# Patient Record
Sex: FEMALE | Race: WHITE | Employment: FULL TIME | URBAN - METROPOLITAN AREA
[De-identification: names, ages, dates, MRNs, and addresses within clinical notes are randomized per-mention and may not be internally consistent; named-entity substitution may affect disease eponyms.]

---

## 2021-01-05 ENCOUNTER — HOSPITAL ENCOUNTER (EMERGENCY)
Age: 26
Discharge: HOME | End: 2021-01-05
Attending: EMERGENCY MEDICINE
Payer: COMMERCIAL

## 2021-01-05 VITALS
HEIGHT: 65 IN | WEIGHT: 205.2 LBS | HEART RATE: 75 BPM | SYSTOLIC BLOOD PRESSURE: 117 MMHG | BODY MASS INDEX: 34.19 KG/M2 | TEMPERATURE: 99.2 F | DIASTOLIC BLOOD PRESSURE: 73 MMHG | OXYGEN SATURATION: 99 % | RESPIRATION RATE: 18 BRPM

## 2021-01-05 DIAGNOSIS — Z20.822 SUSPECTED COVID-19 VIRUS INFECTION: Primary | ICD-10-CM

## 2021-01-05 PROCEDURE — 99282 EMERGENCY DEPT VISIT SF MDM: CPT

## 2021-01-05 PROCEDURE — U0003 INFECTIOUS AGENT DETECTION BY NUCLEIC ACID (DNA OR RNA); SEVERE ACUTE RESPIRATORY SYNDROME CORONAVIRUS 2 (SARS-COV-2) (CORONAVIRUS DISEASE [COVID-19]), AMPLIFIED PROBE TECHNIQUE, MAKING USE OF HIGH THROUGHPUT TECHNOLOGIES AS DESCRIBED BY CMS-2020-01-R: HCPCS

## 2021-01-05 RX ORDER — BENZONATATE 100 MG/1
100 CAPSULE ORAL 3 TIMES DAILY PRN
Qty: 30 CAPSULE | Refills: 0 | Status: SHIPPED | OUTPATIENT
Start: 2021-01-05 | End: 2021-01-12

## 2021-01-05 RX ORDER — ALBUTEROL SULFATE 90 UG/1
2 AEROSOL, METERED RESPIRATORY (INHALATION) EVERY 4 HOURS PRN
Qty: 1 INHALER | Refills: 0 | Status: SHIPPED | OUTPATIENT
Start: 2021-01-05 | End: 2021-03-19

## 2021-01-05 ASSESSMENT — PAIN SCALES - GENERAL: PAINLEVEL_OUTOF10: 3

## 2021-01-06 ENCOUNTER — CARE COORDINATION (OUTPATIENT)
Dept: CARE COORDINATION | Age: 26
End: 2021-01-06

## 2021-01-06 LAB
SARS-COV-2, RAPID: ABNORMAL
SARS-COV-2: ABNORMAL
SARS-COV-2: DETECTED
SOURCE: ABNORMAL

## 2021-01-06 NOTE — ACP (ADVANCE CARE PLANNING)
Advance Care Planning   Healthcare Decision Maker:viktor her boyfriend      Click here to complete Healthcare Decision Makers including selection of the Healthcare Decision Maker Relationship (ie \"Primary\")  Today we documented desired Decision Maker(s), who is (are) NOT Legal Next of Stephen 71. ACP documents are required for decision maker authority. explained to pt she will need documentation for her boyfriend to be her healthcare decision maker. She did not want to list anyone else at this time.

## 2021-01-06 NOTE — ED NOTES
Patient presented to the ED with complaints of headache, generalized body aches and loss of smell. Stated this started Sunday and has progressively got worse. She denies fever or SOB. No diarrhea, or vomiting. Independent. A&Ox4.       Aixa Chen RN  01/05/21 6760

## 2021-01-06 NOTE — ED PROVIDER NOTES
range of motion and neck supple. Cardiovascular:      Rate and Rhythm: Normal rate and regular rhythm. Pulmonary:      Effort: Pulmonary effort is normal.      Breath sounds: Normal breath sounds. Abdominal:      General: There is no distension. Palpations: Abdomen is soft. Tenderness: There is no abdominal tenderness. Musculoskeletal: Normal range of motion. Skin:     General: Skin is warm. Findings: No rash. Neurological:      Mental Status: She is alert and oriented to person, place, and time. Psychiatric:         Behavior: Behavior normal.                 DIAGNOSTIC RESULTS     EKG: All EKG's are interpreted by the Emergency Department Physician who either signs or Co-signs this chart in the absence of a cardiologist.        RADIOLOGY:   Non-plain film images such as CT, Ultrasound and MRI are read by the radiologist. Plain radiographic images arevisualized and preliminarily interpreted by the emergency physician with the below findings:        Interpretation per the Radiologist below, if available at thetime of this note:          ED BEDSIDE ULTRASOUND:   Performed by ED Physician - none    LABS:  Labs Reviewed   COVID-19       All other labs were within normal range or not returned as of this dictation. EMERGENCY DEPARTMENT COURSE and DIFFERENTIAL DIAGNOSIS/MDM:   Vitals:    Vitals:    01/05/21 2013   BP: 117/73   Pulse: 75   Resp: 18   Temp: 99.2 °F (37.3 °C)   TempSrc: Oral   SpO2: 99%   Weight: 205 lb 3.2 oz (93.1 kg)   Height: 5' 5\" (1.651 m)       The patient was evaluated during the global COVID-19 pandemic, and that diagnosis was suspected/considered upon their initial presentation. Their evaluation, treatment and testing was consistent with current guidelines for patients who present with complaints or symptoms that may be related to COVID-19. Patient's oxygen level is 99% on room air. No respiratory distress.   Patient will be discharged home after nonrapid Covid testing with symptomatic management and work note. CONSULTS:  None    PROCEDURES:  Procedures        FINAL IMPRESSION      1. Suspected COVID-19 virus infection          DISPOSITION/PLAN   DISPOSITION Decision To Discharge 01/05/2021 08:48:09 PM      PATIENTREFERRED TO:   No follow-up provider specified.     DISCHARGE MEDICATIONS:     New Prescriptions    ALBUTEROL SULFATE HFA (PROAIR HFA) 108 (90 BASE) MCG/ACT INHALER    Inhale 2 puffs into the lungs every 4 hours as needed for Wheezing    BENZONATATE (TESSALON PERLES) 100 MG CAPSULE    Take 1 capsule by mouth 3 times daily as needed for Cough           (Please note that portions of this note were completed with a voice recognition program.  Efforts were made to edit thedictations but occasionally words are mis-transcribed.)    DEB Ly PA-C  01/05/21 [address] [place] [address], MD  01/06/21 0039

## 2021-01-06 NOTE — CARE COORDINATION
Patient contacted regarding Jenae Liming. Discussed COVID-19 related testing which was pending at this time. Test results were pending. Patient informed of results, if available? Yes    Care Transition Nurse/ Ambulatory Care Manager contacted the patient by telephone to perform post discharge assessment. Call within 2 business days of discharge: Yes. Verified name and  with patient as identifiers. Provided introduction to self, and explanation of the CTN/ACM role, and reason for call due to risk factors for infection and/or exposure to COVID-19. Symptoms reviewed with patient who verbalized the following symptoms: pain or aching joints. Due to no new or worsening symptoms encounter was not routed to provider for escalation. Discussed follow-up appointments. If no appointment was previously scheduled, appointment scheduling offered: Yes  [facility] follow up appointment(s): No future appointments. Non-[facility] follow up appointment(s): will use [email or web address]    Non-face-to-face services provided:  Reviewed and followed up on pending diagnostic tests and treatments-covid      Advance Care Planning:   Does patient have an Advance Directive:  decision maker updated. Patient has following risk factors of: no known risk factors. CTN/ACM reviewed discharge instructions, medical action plan and red flags such as increased shortness of breath, increasing fever and signs of decompensation with patient who verbalized understanding. Discussed exposure protocols and quarantine with CDC Guidelines What to do if you are sick with coronavirus disease .  Patient was given an opportunity for questions and concerns. The patient agrees to contact the [facility] exposure line [phone], local [facility] department [place] [facility]: ([phone]) and PCP office for questions related to their healthcare. CTN/ACM provided contact information for future needs.     Reviewed and educated patient on any new and changed

## 2021-02-14 ENCOUNTER — APPOINTMENT (OUTPATIENT)
Dept: ULTRASOUND IMAGING | Age: 26
End: 2021-02-14
Payer: COMMERCIAL

## 2021-02-14 ENCOUNTER — HOSPITAL ENCOUNTER (EMERGENCY)
Age: 26
Discharge: HOME | End: 2021-02-15
Attending: EMERGENCY MEDICINE
Payer: COMMERCIAL

## 2021-02-14 VITALS
BODY MASS INDEX: 31.39 KG/M2 | SYSTOLIC BLOOD PRESSURE: 125 MMHG | OXYGEN SATURATION: 100 % | WEIGHT: 200 LBS | TEMPERATURE: 97.2 F | HEIGHT: 67 IN | RESPIRATION RATE: 16 BRPM | HEART RATE: 88 BPM | DIASTOLIC BLOOD PRESSURE: 84 MMHG

## 2021-02-14 DIAGNOSIS — N30.00 ACUTE CYSTITIS WITHOUT HEMATURIA: Primary | ICD-10-CM

## 2021-02-14 DIAGNOSIS — N76.0 BACTERIAL VAGINOSIS: ICD-10-CM

## 2021-02-14 DIAGNOSIS — B96.89 BACTERIAL VAGINOSIS: ICD-10-CM

## 2021-02-14 DIAGNOSIS — O02.1 MISSED ABORTION: ICD-10-CM

## 2021-02-14 LAB
-: ABNORMAL
AMORPHOUS: ABNORMAL
BACTERIA: ABNORMAL
BILIRUBIN URINE: NEGATIVE
CASTS UA: ABNORMAL /LPF (ref 0–8)
COLOR: YELLOW
CRYSTALS, UA: ABNORMAL /HPF
DIRECT EXAM: ABNORMAL
EPITHELIAL CELLS UA: ABNORMAL /HPF (ref 0–5)
GLUCOSE URINE: NEGATIVE
HCG QUANTITATIVE: ABNORMAL IU/L
KETONES, URINE: NEGATIVE
LEUKOCYTE ESTERASE, URINE: ABNORMAL
Lab: ABNORMAL
MUCUS: ABNORMAL
NITRITE, URINE: NEGATIVE
OTHER OBSERVATIONS UA: ABNORMAL
PH UA: 5.5 (ref 5–8)
PROTEIN UA: NEGATIVE
RBC UA: ABNORMAL /HPF (ref 0–4)
RENAL EPITHELIAL, UA: ABNORMAL /HPF
SPECIFIC GRAVITY UA: 1.01 (ref 1–1.03)
SPECIMEN DESCRIPTION: ABNORMAL
TRICHOMONAS: ABNORMAL
TURBIDITY: CLEAR
URINE HGB: NEGATIVE
UROBILINOGEN, URINE: NORMAL
WBC UA: ABNORMAL /HPF (ref 0–5)
YEAST: ABNORMAL

## 2021-02-14 PROCEDURE — 6370000000 HC RX 637 (ALT 250 FOR IP): Performed by: STUDENT IN AN ORGANIZED HEALTH CARE EDUCATION/TRAINING PROGRAM

## 2021-02-14 PROCEDURE — 76801 OB US < 14 WKS SINGLE FETUS: CPT

## 2021-02-14 PROCEDURE — 96372 THER/PROPH/DIAG INJ SC/IM: CPT

## 2021-02-14 PROCEDURE — 99285 EMERGENCY DEPT VISIT HI MDM: CPT

## 2021-02-14 PROCEDURE — 87591 N.GONORRHOEAE DNA AMP PROB: CPT

## 2021-02-14 PROCEDURE — 87510 GARDNER VAG DNA DIR PROBE: CPT

## 2021-02-14 PROCEDURE — 86850 RBC ANTIBODY SCREEN: CPT

## 2021-02-14 PROCEDURE — 84702 CHORIONIC GONADOTROPIN TEST: CPT

## 2021-02-14 PROCEDURE — 87480 CANDIDA DNA DIR PROBE: CPT

## 2021-02-14 PROCEDURE — 81001 URINALYSIS AUTO W/SCOPE: CPT

## 2021-02-14 PROCEDURE — 86901 BLOOD TYPING SEROLOGIC RH(D): CPT

## 2021-02-14 PROCEDURE — 87086 URINE CULTURE/COLONY COUNT: CPT

## 2021-02-14 PROCEDURE — 86900 BLOOD TYPING SEROLOGIC ABO: CPT

## 2021-02-14 PROCEDURE — 87660 TRICHOMONAS VAGIN DIR PROBE: CPT

## 2021-02-14 PROCEDURE — 93976 VASCULAR STUDY: CPT

## 2021-02-14 PROCEDURE — 87491 CHLMYD TRACH DNA AMP PROBE: CPT

## 2021-02-14 RX ORDER — CEPHALEXIN 500 MG/1
500 CAPSULE ORAL 4 TIMES DAILY
Qty: 28 CAPSULE | Refills: 0 | Status: SHIPPED | OUTPATIENT
Start: 2021-02-14 | End: 2021-02-21

## 2021-02-14 RX ORDER — METRONIDAZOLE 7.5 MG/G
GEL VAGINAL
Qty: 1 TUBE | Refills: 0 | Status: SHIPPED | OUTPATIENT
Start: 2021-02-14 | End: 2021-02-21

## 2021-02-14 RX ORDER — ACETAMINOPHEN 500 MG
1000 TABLET ORAL ONCE
Status: COMPLETED | OUTPATIENT
Start: 2021-02-14 | End: 2021-02-14

## 2021-02-14 RX ADMIN — ACETAMINOPHEN 1000 MG: 500 TABLET ORAL at 19:27

## 2021-02-14 ASSESSMENT — ENCOUNTER SYMPTOMS
VOMITING: 1
BACK PAIN: 0
ABDOMINAL PAIN: 1
COUGH: 0
NAUSEA: 1
SHORTNESS OF BREATH: 0
CONSTIPATION: 0
DIARRHEA: 0

## 2021-02-14 ASSESSMENT — PAIN SCALES - GENERAL
PAINLEVEL_OUTOF10: 1
PAINLEVEL_OUTOF10: 0

## 2021-02-14 NOTE — LETTER
[facility]  [address] [address]  Phone: [phone]               February 15, 2021    Patient: Paula Beal   YOB: 1995   Date of Visit: 2/14/2021       To Whom It May Concern:    Paula Beal was seen and treated in our emergency department on 2/14/2021.  She   May return to work 2/21/2021    Sincerely,       Tiffany Javed RN         Signature:__________________________________

## 2021-02-14 NOTE — ED TRIAGE NOTES
Pt sts she is +HCG but has not been to OB yet. Pt reported abd cramping without vaginal bleeding/discharge. Pt sts she is unsure of gestational age due to irregular cycles. Pt sts she is not 20 weeks.

## 2021-02-15 LAB
ABO/RH: NORMAL
ANTIBODY SCREEN: NEGATIVE
C TRACH DNA GENITAL QL NAA+PROBE: NEGATIVE
CULTURE: NORMAL
Lab: NORMAL
N. GONORRHOEAE DNA: NEGATIVE
SPECIMEN DESCRIPTION: NORMAL
SPECIMEN DESCRIPTION: NORMAL

## 2021-02-15 PROCEDURE — 99242 OFF/OP CONSLTJ NEW/EST SF 20: CPT | Performed by: OBSTETRICS & GYNECOLOGY

## 2021-02-15 PROCEDURE — 6360000002 HC RX W HCPCS: Performed by: STUDENT IN AN ORGANIZED HEALTH CARE EDUCATION/TRAINING PROGRAM

## 2021-02-15 RX ADMIN — HUMAN RHO(D) IMMUNE GLOBULIN 300 MCG: 300 INJECTION, SOLUTION INTRAMUSCULAR at 00:18

## 2021-02-15 NOTE — ED NOTES
Pt notified of spontaneous , water and tissues provided. Pt and geremias tearful. No current complaints at this time       Isabel Zuluaga, WOJCIECH  21 4318

## 2021-02-15 NOTE — ED PROVIDER NOTES
[facility]   Emergency Department  Emergency Medicine Attending Sign-out     Care of [place] was assumed from previous attending Dr. Kamran Blackburn and is being seen for Abdominal Cramping  . The patient's initial evaluation and plan have been discussed with the prior provider who initially evaluated the patient. Attestation  I was available and discussed any additional care issues that arose and coordinated the management plans with the resident(s) caring for the patient during my duty period. Any areas of disagreement with resident's documentation of care or procedures are noted on the chart. I was personally present for the key portions of any/all procedures, during my duty period. I have documented in the chart those procedures where I was not present during the key portions. BRIEF PATIENT SUMMARY/MDM COURSE PER INITIAL PROVIDER:   RECENT VITALS:     Temp: 97.2 °F (36.2 °C),  Pulse: 88, Resp: 16, BP: 125/84, SpO2: 100 %    This patient is a 23 y.o. Female with spontaneous . Patient has [address] with no fetal heart activity. Awaiting OB consult. DIAGNOSTICS/MEDICATIONS:     MEDICATIONS GIVEN:  ED Medication Orders (From admission, onward)    Start Ordered     Status Ordering Provider    21 [id number]  acetaminophen (TYLENOL) tablet 1,000 mg  ONCE      Last MAR action: Given - by Vielka STAPLETON on 21 at [address], [address] [facility] DNA PROBE - Abnormal; Notable for the following components:       Result Value    Direct Exam POSITIVE for Gardnerella vaginalis.  (*)     All other components within normal limits   HCG, QUANTITATIVE, PREGNANCY - Abnormal; Notable for the following components:    hCG XXPPT 27,358 (*)     All other components within normal limits   URINALYSIS WITH MICROSCOPIC - Abnormal; Notable for the following components:    Leukocyte Esterase, Urine SMALL (*)     Bacteria, UA FEW (*)     All other components within normal limits   CULTURE, URINE   C.TRACHOMATIS N.GONORRHOEAE DNA   ABO/RH       RADIOLOGY  Us Ob Less Than 14 Weeks Single Or First Gestation    Result Date: 2/14/2021  EXAMINATION: TRANSABDOMINAL FIRST TRIMESTER OBSTETRIC PELVIC ULTRASOUND WITH COLOR DOPPLER FLOW 2/14/2021 TECHNIQUE: TRANSABDOMINAL PELVIC ULTRASOUND WITH COLOR DOPPLER FLOW COMPARISON: None HISTORY: ORDERING SYSTEM PROVIDED HISTORY: R/o ectopic TECHNOLOGIST PROVIDED HISTORY: R/o ectopic FINDINGS: The uterus is mildly enlarged measuring 12.7 cm. .  There is an intrauterine gestational sac. There is evidence of an intrauterine pregnancy and an embryo seen within the gestational sac. The crown rump length measures 1.7 cm corresponding to 8 weeks, 1 day gestational age. No fetal heart rate is identified. There is a subchorionic hemorrhage measuring 3 cm Both ovaries are normal size and echogenicity. No adnexal masses are identified. There is nofree fluid in the cul-de-sac. Intrauterine pregnancy with a gestational age of 11 weeks, 1 day. No fetal heart rate is identified and the findings are worrisome for early pregnancy failure.        OUTSTANDING TASKS / ADDITIONAL COMMENTS   1. OB consult    Arian Mccauley MD  Emergency Medicine Attending  [facility]        Remy Mccall MD  02/14/21 4865

## 2021-02-15 NOTE — ED PROVIDER NOTES
[facility]  ED  Emergency Department Encounter  Emergency Medicine Resident     Pt Name: Janene Antunez  MRN: [id number]  [place] 1995  Date of evaluation: 2/14/21  PCP:  No primary care provider on file. CHIEF COMPLAINT       Chief Complaint   Patient presents with    Abdominal Cramping       HISTORY OFPRESENT ILLNESS  (Location/Symptom, Timing/Onset, Context/Setting, Quality, Duration, Modifying Factors,Severity.)      Janene Antunez is a 23 y.o. female who presents with complaints of lower abdominal cramping. Patient is G3, P2 at unknown gestational weeks. She states her last period was November but she has irregular periods and has not been seen or evaluated by OB during this pregnancy. She does admit to some nausea and nonbloody nonbilious emesis during this pregnancy but currently denies. She states the abdominal cramping has been intermittent over the last couple days. She denies any vaginal bleeding, dysuria, hematuria or passage of large clots. She denies any history of miscarriage previously. She states her other 2 pregnancies were normal, full-term vaginal deliveries with no complications. She does admit to some yellowish discharge but has no concerns for STDs. She denies fevers, chest pain, shortness of breath, lightheadedness, dizziness. PAST MEDICAL / SURGICAL / SOCIAL / FAMILY HISTORY      has no past medical history on file. has no past surgical history on file.      Social History     Socioeconomic History    Marital status: Single     Spouse name: Not on file    Number of children: Not on file    Years of education: Not on file    Highest education level: Not on file   Occupational History    Not on file   Social Needs    Financial resource strain: Not on file    Food insecurity     Worry: Not on file     Inability: Not on file    Transportation needs     Medical: Not on file     Non-medical: Not on file   Tobacco Use    Smoking status: Not on file   Substance and Sexual Activity    Alcohol use: Never     Frequency: Never    Drug use: Never    Sexual activity: Yes     Partners: Male   Lifestyle    Physical activity     Days per week: Not on file     Minutes per session: Not on file    Stress: Not on file   Relationships    Social connections     Talks on phone: Not on file     Gets together: Not on file     Attends Restorationist service: Not on file     Active member of club or organization: Not on file     Attends meetings of clubs or organizations: Not on file     Relationship status: Not on file    Intimate partner violence     Fear of current or ex partner: Not on file     Emotionally abused: Not on file     Physically abused: Not on file     Forced sexual activity: Not on file   Other Topics Concern    Not on file   Social History Narrative    Not on file       History reviewed. No pertinent family history. Allergies:  Patient has no known allergies. Home Medications:  Prior to Admission medications    Medication Sig Start Date End Date Taking? Authorizing Provider   cephALEXin (KEFLEX) 500 MG capsule Take 1 capsule by mouth 4 times daily for 7 days 2/14/21 2/21/21 Yes Eusebio Yoly, DO   metroNIDAZOLE (METROGEL VAGINAL) 0.75 % vaginal gel Place vaginally 2 times daily for 7 days. 2/14/21 2/21/21 Yes Eusebio Yoly, DO   albuterol sulfate HFA (PROAIR HFA) 108 (90 Base) MCG/ACT inhaler Inhale 2 puffs into the lungs every 4 hours as needed for Wheezing 1/5/21   Fabricio Green PA-C       REVIEW OFSYSTEMS    (2-9 systems for level 4, 10 or more for level 5)      Review of Systems   Constitutional: Negative for activity change, appetite change, chills, fatigue and fever. HENT: Negative for congestion. Eyes: Negative for visual disturbance. Respiratory: Negative for cough and shortness of breath. Cardiovascular: Negative for chest pain and leg swelling. Gastrointestinal: Positive for abdominal pain, nausea and vomiting. Negative for constipation and diarrhea. Genitourinary: Positive for vaginal discharge. Negative for difficulty urinating, dysuria, frequency, hematuria, urgency and vaginal bleeding. Musculoskeletal: Negative for back pain. Skin: Negative for rash and wound. Neurological: Negative for syncope, light-headedness and headaches. Hematological: Does not bruise/bleed easily. PHYSICAL EXAM   (up to 7 for level 4, 8 or more forlevel 5)      INITIAL VITALS:   ED Triage Vitals [02/14/21 1832]   BP Temp Temp Source Pulse Resp SpO2 Height Weight   125/84 97.2 °F (36.2 °C) Skin 88 16 100 % 5' 7\" (1.702 m) 200 lb (90.7 kg)       Physical Exam  Vitals signs and nursing note reviewed. Exam conducted with a chaperone present. Constitutional:       General: She is not in acute distress. Appearance: Normal appearance. She is well-developed. She is not diaphoretic. HENT:      Head: Normocephalic and atraumatic. Nose: Nose normal.   Cardiovascular:      Rate and Rhythm: Normal rate. Pulmonary:      Effort: Pulmonary effort is normal. No respiratory distress. Abdominal:      General: There is no distension. Palpations: Abdomen is soft. Tenderness: There is no abdominal tenderness. There is no guarding. Genitourinary:     Exam position: Supine. Labia:         Right: No rash or tenderness. Left: No rash or tenderness. Vagina: Vaginal discharge (White) present. No erythema, tenderness or bleeding. Cervix: Normal.      Uterus: Tender. Adnexa: Right adnexa normal and left adnexa normal.   Musculoskeletal: Normal range of motion. Skin:     General: Skin is warm and dry. Findings: No rash. Neurological:      Mental Status: She is alert. Mental status is at baseline. Psychiatric:         Behavior: Behavior normal.         Thought Content:  Thought content normal.         DIFFERENTIAL  DIAGNOSIS     PLAN (LABS / IMAGING / EKG):  Orders Placed This Encounter Procedures    Culture, Urine    C.trachomatis N.gonorrhoeae DNA    VAGINITIS DNA PROBE    US DUP ABD PEL RETRO SCROT LIMITED    US OB LESS THAN 14 WEEKS SINGLE OR FIRST GESTATION    HCG, QUANTITATIVE, PREGNANCY    Urinalysis with Microscopic    Inpatient consult to Obstetrics / Gynecology    ABO/RH    RHOGAM INJECTION ONLY       MEDICATIONS ORDERED:  Orders Placed This Encounter   Medications    acetaminophen (TYLENOL) tablet 1,000 mg    cephALEXin (KEFLEX) 500 MG capsule     Sig: Take 1 capsule by mouth 4 times daily for 7 days     Dispense:  28 capsule     Refill:  0    metroNIDAZOLE (METROGEL VAGINAL) 0.75 % vaginal gel     Sig: Place vaginally 2 times daily for 7 days. Dispense:  1 Tube     Refill:  0    DISCONTD: rho(D) immune globulin (HYPERRHO S/D) injection 300 mcg    rho(D) immune globulin (HYPERRHO S/D) injection 300 mcg         Initial MDM/Plan/ED COURSE:    23 y.o. female who presents with complaints of lower abdominal cramping early in pregnancy. On exam patient is well-appearing, nontoxic. Vital signs are within normal limits. On physical exam abdomen is obese, soft, nontender, nondistended. Difficult to palpate uterus. No acute respiratory distress, talking in full sentences. Bedside ultrasound shows likely early intrauterine pregnancy, however given patient's abdominal cramping, no OB care at this point, will plan for transvaginal ultrasound, beta quant, pelvic exam, urinalysis. If work-up unremarkable anticipate discharge home, with Tylenol and OB follow-up.  exam performed with RN at bedside shows some white vaginal discharge, os is closed, no vaginal bleeding or bleeding from the cervix. Some uterine tenderness but no right or left adnexal tenderness. No rashes or lesions. ED Course as of Feb 15 0027   Juanice Lunch Feb 14, 2021 2031 hCG Quant(!): 64,847 [LW]   2037 DIRECT EXAM.(!): POSITIVE for Gardnerella vaginalis.  [LW]   2810 Estimated gestational age of 11 weeks 1 day but no fetal heart rate identified, concerns for pregnancy failure and fetal demise. Patient and fiancé updated. Understanding plan to consult OB.    [LW]   36 Spoke with OB, they will evaluate the patient.    [LW]   7016 OB at bedside    [LW]   2301 OB recommending RhoGam if patient Rh negative. They state that then patient is able to be discharged with follow-up with Dr. Louana Krabbe in 1 to 2 weeks with bleeding return precautions. [LW]   6489 Patient a negative, will plan for RhoGam injection. [LW]      ED Course User Index  [LW] Gonzales Diane, DO      RhoGam given. We will plan to discharge patient home at this time. Patient given prescription for Keflex as well as Flagyl discharge take as prescribed complete the entire course even if she begins to feel better. She was instructed to follow-up with Dr. Louana Krabbe in the next week for reevaluation given missed . Patient given strict return precautions including any new or concerning symptoms such as vaginal bleeding, lightheadedness, dizziness, chest pain, shortness of breath, worsening abdominal pain or any other new or concerning symptoms. Patient agreed for discharge this time, all questions answered. Patient discharged home in stable condition. DIAGNOSTIC RESULTS / EMERGENCYDEPARTMENT COURSE / MDM     LABS:  Labs Reviewed   VAGINITIS DNA PROBE - Abnormal; Notable for the following components:       Result Value    Direct Exam POSITIVE for Gardnerella vaginalis.  (*)     All other components within normal limits   HCG, QUANTITATIVE, PREGNANCY - Abnormal; Notable for the following components:    hCG ROFRC 66,189 (*)     All other components within normal limits   URINALYSIS WITH MICROSCOPIC - Abnormal; Notable for the following components:    Leukocyte Esterase, Urine SMALL (*)     Bacteria, UA FEW (*)     All other components within normal limits   CULTURE, URINE   C.TRACHOMATIS N.GONORRHOEAE DNA   ABO/RH   RHOGAM INJECTION ONLY PROCEDURES:  None    CONSULTS:  IP CONSULT TO OB GYN    CRITICAL CARE:  Please see attending note    FINAL IMPRESSION      1. Acute cystitis without hematuria    2. Bacterial vaginosis    3. Missed           DISPOSITION / PLAN     DISPOSITION Decision To Discharge 02/15/2021 12:18:56 AM      PATIENT REFERRED TO:  [facility]  [address] [facility]  [phone]  Go to   If symptoms worsen    [address] [place] [address] [id number]  Call in 3 days      SeeJose J Sparks DO  [address] [facility]  [phone]    In 1 week  Missed AB       DISCHARGE MEDICATIONS:  New Prescriptions    CEPHALEXIN (KEFLEX) 500 MG CAPSULE    Take 1 capsule by mouth 4 times daily for 7 days    METRONIDAZOLE (METROGEL VAGINAL) 0.75 % VAGINAL GEL    Place vaginally 2 times daily for 7 days.        Dandre Macias DO  Emergency Medicine Resident    (Please note that portions of this note were completed with a voice recognition program.Efforts were made to edit the dictations but occasionally words are mis-transcribed.)        Dandre Macias DO  Resident  02/15/21 1911

## 2021-02-15 NOTE — ED NOTES
Pt resting in bed with even non labored breaths. Pt A&Ox4. Pt denies any complaints. Pt shows no signs of distress. Will continue to monitor.         Godfrey Plummer RN  02/14/21 2018

## 2021-02-15 NOTE — ED NOTES
The following labs labeled with pt sticker and tubed:     [] Lavender   [] on ice   [] Blue   [] Green/yellow  [] Green/black [] on ice  [x] Pink  [] Red  [] Yellow     [] COVID-19 swab    [] Rapid     [] Urine Sample  [] Pelvic Cultures    [] Blood Cultures            Gaston Polk RN  02/14/21 3775

## 2021-02-15 NOTE — ED NOTES
OB at bedside     Selwyn Mark, [id number]  02/14/21 4395

## 2021-02-15 NOTE — ED NOTES
The following labs labeled with pt sticker and tubed:     [x] Lavender   [] on ice   [x] Blue   [x] Green/yellow  [] Green/black [] on ice  [] Pink  [] Red  [x] Yellow     [] COVID-19 swab    [] Rapid     [x] Urine Sample  [] Pelvic Cultures    [] Blood Cultures            Isabel Zuluaga RN  02/14/21 1935

## 2021-02-15 NOTE — ED PROVIDER NOTES
[facility] ED     Emergency Department     Faculty Attestation    I performed a history and physical examination of the patient and discussed management with the resident. I reviewed the residents note and agree with the documented findings and plan of care. Any areas of disagreement are noted on the chart. I was personally present for the key portions of any procedures. I have documented in the chart those procedures where I was not present during the key portions. I have reviewed the emergency nurses triage note. I agree with the chief complaint, past medical history, past surgical history, allergies, medications, social and family history as documented unless otherwise noted below. For Physician Assistant/ Nurse Practitioner cases/documentation I have personally evaluated this patient and have completed at least one if not all key elements of the E/M (history, physical exam, and MDM). Additional findings are as noted. Patient presents with lower abdominal cramping. She says she has taken 2 home pregnancy test which have been positive. She says that the last period that she remembers having was around Thanksgiving. She denies fever, chest pain, shortness of breath, nausea, vomiting, abnormal vaginal bleeding or discharge. She denies dysuria. On exam, patient is resting comfortably in the bed. Lungs are clear to auscultation bilaterally heart sounds are normal.  Abdomen is soft and nontender. No rebound or guarding is present. The resident will perform a pelvic exam.  Will check pelvic labs, urinalysis, transvaginal ultrasound.       Rasheed Rodriguez MD  Attending Emergency  Physician              Xena Ferrera MD  02/14/21 Rudolph Álvarez

## 2021-02-15 NOTE — ED NOTES
The following labs labeled with pt sticker and tubed:     [] Lavender   [] on ice   [] Blue   [] Green/yellow  [] Green/black [] on ice  [] Pink  [] Red  [] Yellow     [] COVID-19 swab    [] Rapid     [] Urine Sample  [x] Pelvic Cultures    [] Blood Cultures            Bhaskar Schulz RN  02/14/21 1945

## 2021-02-15 NOTE — CONSULTS
[address] [facility]    Patient Name: General Briggs     Patient : 1995  Room/Bed:   Admission Date/Time: 2021  6:59 PM  Primary Care Physician: No primary care provider on file. Consulting Provider: Ariana Francis MD  Reason for Consult: Vaginal spotting and abdominal cramps    CC:   Chief Complaint   Patient presents with    Abdominal Cramping                HPI: General Briggs is a 23 y.o. female . presents to the ED with complains of abdominal cramping that has been going on for a couple of days. Patient said she has last period in November with episodes of irregular spotting and was experiencing lower abdominal cramps with episode of nausea and  non-bloody vomitus. Patient came to ED for further evaluation. Patient is denying any fever, chills, HA, CP, SOB, racing of heart, difficulty urination and constipation. In the ED patient was vitally stable , initial CBC was unremarkable. B-Hcg was [id number] and transvaginal US showed intrauterine pregnancy with gestational sac of 8 weeks with no heart rate identified. REVIEW OF SYSTEMS:   A minimum of an eleven point review of systems was completed.     Constitutional: negative fever, negative chills  HEENT: negative visual disturbances, negative headaches  Respiratory: negative dyspnea, negative cough  Cardiovascular: negative chest pain,  negative palpitations  Gastrointestinal: abdominal cramps, negative RUQ pain, N/V, negative diarrhea, negative constipation  Genitourinary: negative dysuria, negative vaginal discharge, negative vaginal bleeding   Dermatological: negative rash, negative wounds  Hematologic: negative bleeding/clotting disorder  Immunologic: negative recent illness, negative recent sick contact, negative allergic reactions  Lymphatic: negative lymph nodes  Musculoskeletal: negative back pain, negative myalgias, negative arthralgias  Neurological:  negative dizziness, negative weakness  Behavior/Psych: negative depression, negative anxiety  _______________________________________________________________________    GYNECOLOGICAL HISTORY:  Sexually Active: single partner  STD History: denies    Pap History: not available in EMR and patient is unsure. States she thinks she has had one pap in the past.    Permanent Sterilization: N/A   Reversible Birth Control: N/A  Hormone Replacement Exposure: N/A    OBSTETRICAL HISTORY:   OB History   No obstetric history on file. PAST MEDICAL HISTORY:   has no past medical history on file. PAST SURGICAL HISTORY:   has no past surgical history on file. ALLERGIES:  Allergies as of 02/14/2021    (No Known Allergies)       MEDICATIONS:  Current Facility-Administered Medications   Medication Dose Route Frequency Provider Last Rate Last Admin    rho(D) immune globulin (HYPERRHO S/D) injection 300 mcg  300 mcg Intramuscular Once Jolanta Gordilol, DO         Current Outpatient Medications   Medication Sig Dispense Refill    cephALEXin (KEFLEX) 500 MG capsule Take 1 capsule by mouth 4 times daily for 7 days 28 capsule 0    metroNIDAZOLE (METROGEL VAGINAL) 0.75 % vaginal gel Place vaginally 2 times daily for 7 days. 1 Tube 0    albuterol sulfate HFA (PROAIR HFA) 108 (90 Base) MCG/ACT inhaler Inhale 2 puffs into the lungs every 4 hours as needed for Wheezing 1 Inhaler 0       FAMILY HISTORY:  Family History of Breast, Ovarian, Colon or Uterine Cancer: No   family history is not on file. SOCIAL HISTORY:   reports that she does not drink alcohol or use drugs. ________________________________________________________________________                                    Chicaanalikemal StanleyAniyah:  Vitals:    02/14/21 1832   BP: 125/84   Pulse: 88   Resp: 16   Temp: 97.2 °F (36.2 °C)   SpO2: 100%                                                 INPUT/OUTPUT:  No intake/output data recorded. No intake/output data recorded. PHYSICAL EXAM:     General Appearance: Appears healthy. Alert; in no acute distress. Pleasant. Skin: Skin color, texture, turgor normal. No rashes or lesions. Lymphatic: No abnormally enlarged lymph nodes. Neck and EENT: normal atraumatic, no neck masses, normal thyroid, no jvd  Respiratory: Normal expansion. Clear to auscultation. No rales, rhonchi, or wheezing. Cardiovascular: regular rate and rhythm  Pelvic Exam: performed by ED Resident. Patient declining additional exam. Per Dr. Galina De Souza os is closed without active bleeding. Rectal Exam: exam declined by patient  Musculoskeletal: no gross abnormalities  Extremities: non-tender BLE and non-edematous  Psych:  oriented to time, place and person       LAB RESULTS:  Results for orders placed or performed during the hospital encounter of 02/14/21   VAGINITIS DNA PROBE    Specimen: Vaginal   Result Value Ref Range    Specimen Description . VAGINA     Special Requests NOT REPORTED     Direct Exam POSITIVE for Gardnerella vaginalis. (A)     Direct Exam NEGATIVE for Candida sp. Direct Exam NEGATIVE for Trichomonas vaginalis     Direct Exam       Method of testing is a DNA probe intended for detection and identification of Candida species, Gardnerella vaginalis, and Trichomonas vaginalis nucleic acid in vaginal fluid specimens from patients with symptoms of vaginitis/vaginosis.    HCG, QUANTITATIVE, PREGNANCY   Result Value Ref Range    hCG Quant 73,439 (H) <5 IU/L   Urinalysis with Microscopic   Result Value Ref Range    Color, UA YELLOW YELLOW    Turbidity UA CLEAR CLEAR    Glucose, Ur NEGATIVE NEGATIVE    Bilirubin Urine NEGATIVE NEGATIVE    Ketones, Urine NEGATIVE NEGATIVE    Specific Gravity, UA 1.009 1.005 - 1.030    Urine Hgb NEGATIVE NEGATIVE    pH, UA 5.5 5.0 - 8.0    Protein, UA NEGATIVE NEGATIVE    Urobilinogen, Urine Normal Normal    Nitrite, Urine NEGATIVE NEGATIVE    Leukocyte

## 2021-02-16 LAB
BLD PROD TYP BPU: NORMAL
DISPENSE STATUS BLOOD BANK: NORMAL
TRANSFUSION STATUS: NORMAL
UNIT DIVISION: 0
UNIT NUMBER: NORMAL

## 2021-02-17 ENCOUNTER — OFFICE VISIT (OUTPATIENT)
Dept: OBGYN CLINIC | Age: 26
End: 2021-02-17
Payer: COMMERCIAL

## 2021-02-17 VITALS
BODY MASS INDEX: 35.29 KG/M2 | HEART RATE: 101 BPM | SYSTOLIC BLOOD PRESSURE: 113 MMHG | WEIGHT: 211.8 LBS | HEIGHT: 65 IN | DIASTOLIC BLOOD PRESSURE: 75 MMHG

## 2021-02-17 DIAGNOSIS — Z76.89 ENCOUNTER TO ESTABLISH CARE: Primary | ICD-10-CM

## 2021-02-17 DIAGNOSIS — O02.1 MISSED ABORTION: ICD-10-CM

## 2021-02-17 PROCEDURE — 99203 OFFICE O/P NEW LOW 30 MIN: CPT | Performed by: OBSTETRICS & GYNECOLOGY

## 2021-02-17 RX ORDER — MISOPROSTOL 200 UG/1
800 TABLET ORAL ONCE
Qty: 4 TABLET | Refills: 0 | Status: SHIPPED | OUTPATIENT
Start: 2021-02-17 | End: 2021-03-19

## 2021-02-17 NOTE — PROGRESS NOTES
Waylon Grimm  2021                         Primary Care Physician: No primary care provider on file. CC:   Chief Complaint   Patient presents with    New Patient     follow-up miscarriage lmp around thanksgiving         HPI: Waylon Grimm is a 23 y.o. female  No LMP recorded (lmp unknown). The patient was seen and examined. She is here to establish care and for follow up of miscarriage. She was seen and evaluated in the [facility] ED on  due to cramping in pregnancy. Pelvic ultrasound at that itme confirmed an 8w1d missed AB. She received rhogam due to Rh negative status. She was counseled on management options and opted for expectant management at that time. Since then, she continues to cramping with slight spotting. She thinks she would like to proceed with medical management at this time. She reports that her periods are irregular and last 5-7 days. She has never gone more than 1-2 months without a period. Her bowel habits are regular. She denies any bloating. She denies dysuria. She denies urinary leaking. She denies vaginal discharge. She is sexually active with a male partner.     Depression Screen: Symptoms of decreased mood absent  Symptoms of anhedonia absent  **If either question is answered in a  positive fashion then complete the PHQ9 Scoring Evaluation and make the appropriate referral**    REVIEW OF SYSTEMS:   Constitutional: negative fever, negative chills  HEENT: negative visual disturbances, negative headaches  Respiratory: negative dyspnea, negative cough  Cardiovascular: negative chest pain,  negative palpitations  Gastrointestinal: negative abdominal pain, negative RUQ pain, negative N/V, negative diarrhea, negative constipation  Genitourinary: negative dysuria, negative vaginal discharge  Dermatological: negative rash  Hematologic: negative bruising  Immunologic/Lymphatic: negative recent illness, negative recent sick contact Musculoskeletal: negative back pain, negative myalgias, negative arthralgias  Neurological:  negative dizziness, negative weakness  Behavior/Psych: negative depression, negative anxiety      GYNECOLOGICAL HISTORY:  Age of Menarche: 17  Age of Menopause: n/a     Sexually Active: has sex with a male  STD History: denies    Pap History: reports one prior pap that was normal  Colposcopy History: denies    Permanent Sterilization: no  Reversible Birth Control: no  Hormone Replacement Exposure: no    OBSTETRICAL HISTORY:  OB History    Para Term  AB Living   2 0 0 0 0 2   SAB TAB Ectopic Molar Multiple Live Births   0 0 0 0 0 2      # Outcome Date GA Lbr Avelino/2nd Weight Sex Delivery Anes PTL Lv   2       Vag-Spont      1       Vag-Spont          PAST MEDICAL HISTORY:   has a past medical history of Asthma. PAST SURGICAL HISTORY:   has no past surgical history on file. ALLERGIES:  has No Known Allergies. MEDICATIONS:  Prior to Admission medications    Medication Sig Start Date End Date Taking? Authorizing Provider   cephALEXin (KEFLEX) 500 MG capsule Take 1 capsule by mouth 4 times daily for 7 days 23 Yes Adonis Art, DO   metroNIDAZOLE (METROGEL VAGINAL) 0.75 % vaginal gel Place vaginally 2 times daily for 7 days. 21 Yes Adonis Art, DO   albuterol sulfate HFA (PROAIR HFA) 108 (90 Base) MCG/ACT inhaler Inhale 2 puffs into the lungs every 4 hours as needed for Wheezing  Patient not taking: Reported on 2021   Manav Mayes PA-C       FAMILY HISTORY:  Family History of Breast, Ovarian, Colon or Uterine Cancer: No   family history includes Breast Cancer in her maternal great grandmother; Breast Cancer (age of onset: 79) in her paternal great grandmother. SOCIAL HISTORY:   reports that she has never smoked. She has never used smokeless tobacco. She reports that she does not drink alcohol or use drugs.     HEALTH MAINTENANCE: Immunization status: stated as up to date, no records available  Gardasil immunization: discussed    VITALS:  Vitals:    21 1415   BP: 113/75   Site: Left Upper Arm   Position: Sitting   Cuff Size: Large Adult   Pulse: 101   Weight: 211 lb 12.8 oz (96.1 kg)   Height: 5' 5\" (1.651 m)                                                                                                                                                                             PHYSICAL EXAM:   General Appearance: Appears healthy. Alert; in no acute distress. Pleasant. Skin: Skin color, texture, turgor normal. No rashes or lesions. HEENT: normocephalic and atraumatic  Respiratory: Normal expansion. Clear to auscultation. No rales, rhonchi, or wheezing. Cardiovascular: normal rate and normal S1 and S2  Abdomen:  soft, non-tender, non-distended, no right upper quadrant tenderness and no CVA tenderness  Musculoskeletal: no gross abnormalities  Extremities: non-tender BLE and non-edematous  Psych:  oriented to time, place and person, mood and affect are within normal limits       ASSESSMENT & PLAN:    Alicia Haynes is a 23 y.o. female  with 8w1d missed AB No LMP recorded (lmp unknown). - Records release signed for prior pap history     - She is unsure if she previously received the Gardasil immunization   - Reviewed expectant, medical and surgical management options for early pregnancy loss, including side effect profiles, dosing regimens and surgical risks/benefits/alternatives. - Risks, benefits and alternatives of surgery discussed, including but not limited to bleeding, scarring, infection, injury to surrounding tissues (bowel, bladder, nerves, vessels, etc.), need for more surgery, need for blood or blood products, death, post-op clots of lung of legs, and pneumonia. Reviewed need for preoperative COVID19 testing. Patient reports h/o COVID19 in early January.

## 2021-02-26 ENCOUNTER — TELEPHONE (OUTPATIENT)
Dept: OBGYN CLINIC | Age: 26
End: 2021-02-26

## 2021-02-26 NOTE — TELEPHONE ENCOUNTER
----- Message from Zeus Sparks, DO sent at 2/25/2021 12:04 PM EST -----  Do you mind calling and seeing how she is doing after using the medication? Did she have bleeding/clots or no? Thanks!

## 2021-03-19 ENCOUNTER — HOSPITAL ENCOUNTER (OUTPATIENT)
Age: 26
Setting detail: SPECIMEN
Discharge: HOME | End: 2021-03-19
Payer: COMMERCIAL

## 2021-03-19 ENCOUNTER — OFFICE VISIT (OUTPATIENT)
Dept: OBGYN CLINIC | Age: 26
End: 2021-03-19
Payer: COMMERCIAL

## 2021-03-19 VITALS
BODY MASS INDEX: 34.05 KG/M2 | HEART RATE: 72 BPM | DIASTOLIC BLOOD PRESSURE: 78 MMHG | WEIGHT: 204.6 LBS | SYSTOLIC BLOOD PRESSURE: 108 MMHG | TEMPERATURE: 96.7 F

## 2021-03-19 DIAGNOSIS — Z01.419 WELL WOMAN EXAM WITH ROUTINE GYNECOLOGICAL EXAM: ICD-10-CM

## 2021-03-19 DIAGNOSIS — Z01.419 WELL WOMAN EXAM WITH ROUTINE GYNECOLOGICAL EXAM: Primary | ICD-10-CM

## 2021-03-19 DIAGNOSIS — F41.9 ANXIETY: ICD-10-CM

## 2021-03-19 DIAGNOSIS — N94.6 DYSMENORRHEA: ICD-10-CM

## 2021-03-19 DIAGNOSIS — O03.9 SPONTANEOUS ABORTION: ICD-10-CM

## 2021-03-19 LAB
CONTROL: PRESENT
DIRECT EXAM: NORMAL
Lab: NORMAL
PREGNANCY TEST URINE, POC: NEGATIVE
SPECIMEN DESCRIPTION: NORMAL

## 2021-03-19 PROCEDURE — 99395 PREV VISIT EST AGE 18-39: CPT | Performed by: OBSTETRICS & GYNECOLOGY

## 2021-03-19 PROCEDURE — 81025 URINE PREGNANCY TEST: CPT | Performed by: OBSTETRICS & GYNECOLOGY

## 2021-03-19 NOTE — PROGRESS NOTES
Kelby Brunson  3/19/2021                         Primary Care Physician: No primary care provider on file. CC:   Chief Complaint   Patient presents with    Annual Exam     last pap  02/2020 N  lmp unknown         HPI: Kelby Brunson is a 23 y.o. female [id number] No LMP recorded (lmp unknown). The patient was seen and examined. She is here for an annual visit. She reports that the medical management of her miscarriage went as expected. She had about 1-2 weeks of bleeding like a period with one day of intense cramping and heavier bleeding. She stopped bleeding/spotting about 1 and half weeks ago. She would like to discuss treatment of her h/o painful periods. They are usually regular and last 6-7 days. NSAIDs help a little. She hated the breakthrough bleeding with the nexplanon. Has tried OCPs but struggled with the dosing. Depo caused weight gain. Patient also reports feeling like she is coping okay with her recent loss. She does admit that she has considered talking with someone about her anxiety. Her boyfriend left right after the miscarriage was diagnosed. Otherwise, she feels like she as some support from family but also that they don't really understand what she is going through. Her bowel habits are regular. She denies any bloating. She denies dysuria. She denies urinary leaking. She denies vaginal discharge. She is not currently sexually active and is not desiring pregnancy.     Depression Screen: Symptoms of decreased mood absent  Symptoms of anhedonia absent  **If either question is answered in a  positive fashion then complete the PHQ9 Scoring Evaluation and make the appropriate referral**    REVIEW OF SYSTEMS:  Constitutional: negative fever, negative chills  HEENT: negative visual disturbances, negative headaches  Respiratory: negative dyspnea, negative cough  Cardiovascular: negative chest pain,  negative palpitations  Gastrointestinal: negative abdominal pain, negative RUQ pain, negative N/V, negative diarrhea, negative constipation  Genitourinary: positive h/o dysmenorrhea, negative dysuria, negative vaginal discharge  Dermatological: negative rash  Hematologic: negative bruising  Immunologic/Lymphatic: negative recent illness, negative recent sick contact  Musculoskeletal: negative back pain, negative myalgias, negative arthralgias  Neurological:  negative dizziness, negative weakness  Behavior/Psych: negative depression, positive anxiety      GYNECOLOGICAL HISTORY:  Age of Menarche: 17  Age of Menopause: n/a     Sexually Active: has sex with a male  STD History: no past history    Pap History: NILM 21 (in media)  Colposcopy History: denies    Permanent Sterilization: no  Reversible Birth Control: no  Hormone Replacement Exposure: no    OBSTETRICAL HISTORY:  OB History    Para Term  AB Living   3 2 2 0 1 2   SAB TAB Ectopic Molar Multiple Live Births   1 0 0 0 0 2      # Outcome Date GA Lbr Avelino/2nd Weight Sex Delivery Anes PTL Lv   3 SAB 2021           2 Term      Vag-Spont      1 Term      Vag-Spont          PAST MEDICAL HISTORY:   has a past medical history of Asthma. PAST SURGICAL HISTORY:   has no past surgical history on file. ALLERGIES:  has No Known Allergies. MEDICATIONS:  Prior to Admission medications    Medication Sig Start Date End Date Taking? Authorizing Provider   norelgestromin-ethinyl estradiol (ORTHO EVRA) 150-35 MCG/24HR Place 1 patch onto the skin once a week 3/19/21  Yes See-Birgit So, DO       FAMILY HISTORY:  Family History of Breast, Ovarian, Colon or Uterine Cancer: No   family history includes Breast Cancer in her maternal great grandmother; Breast Cancer (age of onset: 79) in her paternal great grandmother. SOCIAL HISTORY:   reports that she has never smoked. She has never used smokeless tobacco. She reports that she does not drink alcohol or use drugs.     HEALTH MAINTENANCE:  Immunization status: stated as up to date, no records available  Gardasil immunization: discussed       VITALS:  Vitals:    03/19/21 0850   BP: 108/78   Site: Right Upper Arm   Position: Sitting   Cuff Size: Large Adult   Pulse: 72   Temp: 96.7 °F (35.9 °C)   Weight: 204 lb 9.6 oz (92.8 kg)                                                                                                                                                                        PHYSICAL EXAM:   General Appearance: Appears healthy. Alert; in no acute distress. Pleasant. Skin: Skin color, texture, turgor normal. No rashes or lesions. HEENT: normocephalic and atraumatic  Respiratory: Normal expansion. Clear to auscultation. No rales, rhonchi, or wheezing. Cardiovascular: normal rate and normal S1 and S2  Breast:  (Chest): normal appearance, no masses or tenderness, No nipple retraction or dimpling, No nipple discharge or bleeding, No axillary or supraclavicular adenopathy, Normal to palpation without dominant masses  Abdomen:  soft, non-tender, non-distended, no right upper quadrant tenderness and no CVA tenderness  Pelvic Exam:   External genitalia: normal hair distribution, no lesions or erythema  Urinary system: urethral meatus normal, no bladder tenderness  Vaginal: normal mucosa, no lesions or discharge noted  Cervix: normal appearing cervix without discharge or lesions, no CMT  Adnexa: normal adnexa in size, nontender and no masses  Uterus: about 6-8wk size, anteverted, nontender, no masses  Musculoskeletal: no gross abnormalities  Extremities: non-tender BLE and non-edematous  Psych:  oriented to time, place and person, mood and affect are within normal limits       ASSESSMENT & PLAN:    Jannette Og is a 23 y.o. female [id number] No LMP recorded (lmp unknown). - Pap due 7/2022 per ASCCP guidelines. GC/c and vaginitis swabs collected and sent. - Urine POCT pregnancy negative today. Missed AB resolved.     - Reviewed medical management options for dysmenorrhea, including side effect profiles and dosing regimens. All questions answered. Patient desires to try the patch. Rx sent. - Discussed offer for referral to therapist and/or PCP due to concerns for anxiety. Patient appreciated and desires referral to PCP. Information on Dr. Suzette Truong given. - She is uncertain about the Gardasil immunization   - STD prevention and barrier recommendations reviewed. Patient Active Problem List    Diagnosis Date Noted    Dysmenorrhea 2021    Anxiety 2021    Acute cystitis without hematuria        Return in about 3 months (around 2021) for follow uip. No Patient Care Coordination Note on file. Counseling Completed:    Discussed need for repeat pap as per American Society for Colposcopy and Cervical Pathology guidelines. Birth control and barrier recommendations reviewed. Discussed STD counseling and prevention. Gardasil counseling completed for all patients 9-25 yo. Hereditary Breast, Ovarian, Colon and Uterine Cancer screening done. .  Routine health maintenance per patients PCP. Diagnosis Orders   1. Well woman exam with routine gynecological exam  Chlamydia Trachomatis & Neisseria gonorrhoeae (GC) by amplified detection    VAGINITIS DNA PROBE   2. Spontaneous   POCT urine pregnancy   3. Dysmenorrhea  norelgestromin-ethinyl estradiol (ORTHO EVRA) 150-35 MCG/24HR   4.  Anxiety            See-Birgit Sparks DO   [address]  3/19/2021 9:04 AM

## 2021-03-22 LAB
C TRACH DNA GENITAL QL NAA+PROBE: NEGATIVE
N. GONORRHOEAE DNA: NEGATIVE
SPECIMEN DESCRIPTION: NORMAL

## 2022-02-21 ENCOUNTER — TELEPHONE (OUTPATIENT)
Dept: OBGYN | Age: 27
End: 2022-02-21

## 2022-02-21 NOTE — TELEPHONE ENCOUNTER
Patient called to schedule New OB appt patient stated pregnancy was confirm at [facility] in [place] please request records and contact patient to schedule

## 2022-02-22 NOTE — TELEPHONE ENCOUNTER
Spoke with Glenis and she has ob lab results and ultrasound that was done in [place] and can bring them when she comes in. I think she needs to be scheduled for OB Intake. Please let me know where to put her on the schedule.

## 2022-03-04 ENCOUNTER — CLINICAL DOCUMENTATION (OUTPATIENT)
Dept: OBGYN | Age: 27
End: 2022-03-04

## 2022-03-04 ENCOUNTER — HOSPITAL ENCOUNTER (OUTPATIENT)
Age: 27
Setting detail: SPECIMEN
Discharge: HOME | End: 2022-03-04

## 2022-03-04 ENCOUNTER — OFFICE VISIT (OUTPATIENT)
Dept: OBGYN CLINIC | Age: 27
End: 2022-03-04
Payer: COMMERCIAL

## 2022-03-04 VITALS
DIASTOLIC BLOOD PRESSURE: 76 MMHG | WEIGHT: 214.5 LBS | HEART RATE: 100 BPM | BODY MASS INDEX: 34.47 KG/M2 | SYSTOLIC BLOOD PRESSURE: 104 MMHG | HEIGHT: 66 IN

## 2022-03-04 DIAGNOSIS — N91.2 AMENORRHEA: Primary | ICD-10-CM

## 2022-03-04 DIAGNOSIS — Z67.91 RH NEGATIVE STATE IN ANTEPARTUM PERIOD: ICD-10-CM

## 2022-03-04 DIAGNOSIS — O26.899 RH NEGATIVE STATE IN ANTEPARTUM PERIOD: ICD-10-CM

## 2022-03-04 DIAGNOSIS — N92.6 MISSED MENSES: ICD-10-CM

## 2022-03-04 DIAGNOSIS — N91.2 AMENORRHEA: ICD-10-CM

## 2022-03-04 LAB
ABO/RH: NORMAL
ABSOLUTE EOS #: 0.13 K/UL (ref 0–0.44)
ABSOLUTE IMMATURE GRANULOCYTE: <0.03 K/UL (ref 0–0.3)
ABSOLUTE LYMPH #: 1.36 K/UL (ref 1.1–3.7)
ABSOLUTE MONO #: 0.56 K/UL (ref 0.1–1.2)
ANTIBODY IDENTIFICATION: NORMAL
ANTIBODY SCREEN: POSITIVE
BASOPHILS # BLD: 1 % (ref 0–2)
BASOPHILS ABSOLUTE: 0.04 K/UL (ref 0–0.2)
CONTROL: PRESENT
EOSINOPHILS RELATIVE PERCENT: 2 % (ref 1–4)
HCT VFR BLD CALC: 40.4 % (ref 36.3–47.1)
HEMOGLOBIN: 13.7 G/DL (ref 11.9–15.1)
HEPATITIS B SURFACE ANTIGEN: NONREACTIVE
HEPATITIS C ANTIBODY: NONREACTIVE
HIV AG/AB: NONREACTIVE
IMMATURE GRANULOCYTES: 0 %
LYMPHOCYTES # BLD: 16 % (ref 24–43)
MCH RBC QN AUTO: 29 PG (ref 25.2–33.5)
MCHC RBC AUTO-ENTMCNC: 33.9 G/DL (ref 28.4–34.8)
MCV RBC AUTO: 85.6 FL (ref 82.6–102.9)
MONOCYTES # BLD: 6 % (ref 3–12)
NRBC AUTOMATED: 0 PER 100 WBC
PDW BLD-RTO: 13.8 % (ref 11.8–14.4)
PLATELET # BLD: 268 K/UL (ref 138–453)
PMV BLD AUTO: 11 FL (ref 8.1–13.5)
PREGNANCY TEST URINE, POC: POSITIVE
RBC # BLD: 4.72 M/UL (ref 3.95–5.11)
RUBV IGG SER QL: >500 IU/ML
SEG NEUTROPHILS: 75 % (ref 36–65)
SEGMENTED NEUTROPHILS ABSOLUTE COUNT: 6.62 K/UL (ref 1.5–8.1)
T. PALLIDUM, IGG: NONREACTIVE
WBC # BLD: 8.7 K/UL (ref 3.5–11.3)

## 2022-03-04 PROCEDURE — 81025 URINE PREGNANCY TEST: CPT | Performed by: OBSTETRICS & GYNECOLOGY

## 2022-03-04 PROCEDURE — H1000 PRENATAL CARE ATRISK ASSESSM: HCPCS | Performed by: OBSTETRICS & GYNECOLOGY

## 2022-03-04 PROCEDURE — 99213 OFFICE O/P EST LOW 20 MIN: CPT | Performed by: OBSTETRICS & GYNECOLOGY
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
## 2022-03-05 LAB
-: ABNORMAL
AMPHETAMINE SCREEN URINE: NEGATIVE
BACTERIA: ABNORMAL
BARBITURATE SCREEN URINE: NEGATIVE
BENZODIAZEPINE SCREEN, URINE: NEGATIVE
BILIRUBIN URINE: NEGATIVE
CANDIDA SPECIES, DNA PROBE: NEGATIVE
CANNABINOID SCREEN URINE: NEGATIVE
CASTS UA: ABNORMAL /LPF (ref 0–8)
COCAINE METABOLITE, URINE: NEGATIVE
COLOR: YELLOW
EPITHELIAL CELLS UA: ABNORMAL /HPF (ref 0–5)
GARDNERELLA VAGINALIS, DNA PROBE: NEGATIVE
GLUCOSE URINE: NEGATIVE
KETONES, URINE: NEGATIVE
LEUKOCYTE ESTERASE, URINE: ABNORMAL
METHADONE SCREEN, URINE: NEGATIVE
NITRITE, URINE: NEGATIVE
OPIATES, URINE: NEGATIVE
OXYCODONE SCREEN URINE: NEGATIVE
PH UA: 5.5 (ref 5–8)
PHENCYCLIDINE, URINE: NEGATIVE
PROTEIN UA: NEGATIVE
RBC UA: ABNORMAL /HPF (ref 0–4)
SOURCE: NORMAL
SPECIFIC GRAVITY UA: 1.02 (ref 1–1.03)
TEST INFORMATION: NORMAL
TRICHOMONAS VAGINALIS DNA: NEGATIVE
TURBIDITY: ABNORMAL
URINE HGB: ABNORMAL
UROBILINOGEN, URINE: NORMAL
WBC UA: ABNORMAL /HPF (ref 0–5)

## 2022-03-06 LAB
CULTURE: NORMAL
SPECIMEN DESCRIPTION: NORMAL

## 2022-03-07 ENCOUNTER — TELEPHONE (OUTPATIENT)
Dept: OBGYN CLINIC | Age: 27
End: 2022-03-07

## 2022-03-07 NOTE — TELEPHONE ENCOUNTER
----- Message from Anastasiya Norton MD sent at 3/7/2022  7:51 AM EST -----  Please let pt know that her ultrasound looks normal, but she is measuring smaller than she thought. Her HUGO is 9/22/22.   Thanks

## 2022-03-18 ENCOUNTER — HOSPITAL ENCOUNTER (OUTPATIENT)
Age: 27
Setting detail: SPECIMEN
Discharge: HOME | End: 2022-03-18

## 2022-03-18 ENCOUNTER — INITIAL PRENATAL (OUTPATIENT)
Dept: OBGYN CLINIC | Age: 27
End: 2022-03-18
Payer: COMMERCIAL

## 2022-03-18 VITALS
WEIGHT: 216 LBS | SYSTOLIC BLOOD PRESSURE: 108 MMHG | DIASTOLIC BLOOD PRESSURE: 68 MMHG | HEIGHT: 66 IN | BODY MASS INDEX: 34.72 KG/M2

## 2022-03-18 DIAGNOSIS — R11.0 NAUSEA: ICD-10-CM

## 2022-03-18 DIAGNOSIS — Z87.898 HISTORY OF HEADACHE: ICD-10-CM

## 2022-03-18 DIAGNOSIS — O26.899 RH NEGATIVE STATE IN ANTEPARTUM PERIOD: ICD-10-CM

## 2022-03-18 DIAGNOSIS — Z67.91 RH NEGATIVE STATE IN ANTEPARTUM PERIOD: ICD-10-CM

## 2022-03-18 DIAGNOSIS — Z3A.13 13 WEEKS GESTATION OF PREGNANCY: Primary | ICD-10-CM

## 2022-03-18 DIAGNOSIS — Z3A.13 13 WEEKS GESTATION OF PREGNANCY: ICD-10-CM

## 2022-03-18 PROCEDURE — 1036F TOBACCO NON-USER: CPT | Performed by: NURSE PRACTITIONER

## 2022-03-18 PROCEDURE — 99213 OFFICE O/P EST LOW 20 MIN: CPT | Performed by: NURSE PRACTITIONER

## 2022-03-18 PROCEDURE — G8484 FLU IMMUNIZE NO ADMIN: HCPCS | Performed by: NURSE PRACTITIONER

## 2022-03-18 PROCEDURE — G8417 CALC BMI ABV UP PARAM F/U: HCPCS | Performed by: NURSE PRACTITIONER

## 2022-03-18 PROCEDURE — G8428 CUR MEDS NOT DOCUMENT: HCPCS | Performed by: NURSE PRACTITIONER

## 2022-03-18 PROCEDURE — H1000 PRENATAL CARE ATRISK ASSESSM: HCPCS | Performed by: NURSE PRACTITIONER

## 2022-03-18 RX ORDER — CALCIUM CARBONATE/VITAMIN D3 500-10/5ML
1 LIQUID (ML) ORAL NIGHTLY
Qty: 30 CAPSULE | Refills: 5 | Status: SHIPPED | OUTPATIENT
Start: 2022-03-18 | End: 2022-04-17

## 2022-03-18 RX ORDER — LANOLIN ALCOHOL/MO/W.PET/CERES
50 CREAM (GRAM) TOPICAL 2 TIMES DAILY
Qty: 30 TABLET | Refills: 3 | Status: ON HOLD | OUTPATIENT
Start: 2022-03-18 | End: 2022-09-16 | Stop reason: HOSPADM

## 2022-03-18 NOTE — PATIENT INSTRUCTIONS
Patient Education        Learning About Birth Defects Testing  What is birth defects testing? Birth defects testing is done during pregnancy to look for possible problems with the baby (fetus). A birth defect may have only a minor impact on a child's life. Or it can have a major effect on quality or length of life. You and your doctor can choose from many tests. You may have no tests, one test, or many tests. What are the types of tests? You may have a screening test, a diagnostic test, or both. Screening tests show the chance that a baby has a certain birth defect, such as Down syndrome, spina bifida, or trisomy 25. Diagnostic tests show if a baby has a certain birth defect. · Screening tests and when they're done:  ? Blood tests at 10 to 13 weeks (first trimester)  ? Cell-free fetal DNA test at 10 weeks or later (first trimester)  ? Nuchal translucency test at 10 to 13 weeks (first trimester)  ? Quad screening at 15 to 22 weeks (second trimester)  ? Ultrasound at 18 to 22 weeks (second trimester)  · Diagnostic tests and when they're done:  ? Chorionic villus sampling (CVS) at 10 to 13 weeks (first trimester)  ? Amniocentesis at 13 to 20 weeks (second trimester)  Sometimes doctors will look at the combined screenings that you've had over a period of time. This is called an integrated screening. What are the screening tests? · Blood tests are done to look at different substances in your blood. These tests include cell free fetal DNA and quadruple (quad) blood tests. Both of these tests can help find genetic problems. · Nuchal translucency test uses ultrasound to measure the thickness of the area at the back of the baby's neck. An increase in thickness can be an early sign of certain birth defects. Ultrasound is a tool that uses sound waves to make pictures of your baby and placenta inside the uterus. · Ultrasound lets your doctor see an image of your baby.  It can help your doctor look for problems of the heart, spine, belly, or other areas. What are the diagnostic tests? Chorionic villus sampling (CVS) looks at cells from the placenta. To do the test, your doctor may put a thin tube through your vagina and cervix to take out a small piece of the placenta. Or the doctor may take out the piece through a needle in your belly. This test can diagnose many genetic diseases. But it can't find problems with the spinal cord. Amniocentesis looks at the amniotic fluid that surrounds your baby. Your doctor will put a needle through your belly into your uterus and take out a very small amount of fluid to test.  What are the risks of these tests? There is a small risk of a miscarriage after a CVS or amniocentesis. Your doctor or genetic counselor can help you understand this risk. These tests are generally very safe. Where can you learn more? Go to https://Exam18peSix Month Smiles.TV TubeX. org and sign in to your TapSense account. Enter G030 in the Differential box to learn more about \"Learning About Birth Defects Testing. \"     If you do not have an account, please click on the \"Sign Up Now\" link. Current as of: June 16, 2021               Content Version: 13.1  © 2006-2021 [employer]. Care instructions adapted under license by [facility] ([facility]). If you have questions about a medical condition or this instruction, always ask your healthcare professional. [place] [address] any warranty or liability for your use of this information. Patient Education        Weeks 10 to 14 of Your Pregnancy: Care Instructions  Overview     By weeks 10 to 15 of your pregnancy, the placenta has formed inside your uterus. The placenta's main job is to give your baby oxygen and nutrients through the umbilical cord. It's possible to hear your baby's heartbeat with a special ultrasound device. Your baby's organs are developing. The arms and legs can bend.   This is a good time to think about testing for birth defects. There are two types of tests: screening and diagnostic. Screening tests show the chance that a baby has a certain birth defect. They can't tell you for sure that your baby has a problem. Diagnostic tests show if a baby has a certain birth defect. It's your choice whether to have these tests. You and your partner can talk to your doctor or midwife about tests for birth defects. Follow-up care is a key part of your treatment and safety. Be sure to make and go to all appointments, and call your doctor if you are having problems. It's also a good idea to know your test results and keep a list of the medicines you take. How can you care for yourself at home? Decide about tests  · You can have screening tests and diagnostic tests to check for birth defects. The decision to have a test for birth defects is personal. Think about your age, your chance of passing on a family disease, your need to know about any problems, and what you might do after you have the test results. ? Quadruple (quad) blood test. This screening test can be done between 15 and 22 weeks of pregnancy. It checks the amount of four substances in your blood. The doctor looks at these test results, along with your age and other factors, to find out the chance that your baby may have certain problems. ? Amniocentesis. This diagnostic test is used to look for chromosomal problems in the baby's cells. It can be done between 15 and 20 weeks of pregnancy, usually around week 16.  ? Nuchal translucency test. This test uses ultrasound to measure the thickness of the area at the back of the baby's neck. An increase in the thickness can be an early sign of Down syndrome. ? Chorionic villus sampling (CVS). This is a test that looks for certain genetic problems with your baby. The same genes that are in your baby are in the placenta. A small piece of the placenta is taken out and tested. This test is done when you are 10 to 13 weeks pregnant.   Ease discomfort  · Slow down and take naps when you feel tired. · If your emotions swing, talk to someone. · If your gums bleed, try a softer toothbrush. If your gums are puffy and bleed a lot, see your dentist.  · If you feel dizzy:  ? Get up slowly after sitting or lying down. ? Drink plenty of fluids. ? Eat small snacks to keep your blood sugar stable. ? Put your head between your legs as though you were tying your shoelaces. ? Lie down with your legs higher than your head. Use pillows to prop up your feet. · If you have a headache:  ? Lie down. ? Ask your partner or a good friend for a neck massage. ? Try cool cloths over your forehead or across the back of your neck. ? Use acetaminophen (Tylenol) for pain relief. Do not use nonsteroidal anti-inflammatory drugs (NSAIDs), such as ibuprofen (Advil, Motrin) or naproxen (Aleve), unless your doctor says it is okay. · If you have a nosebleed, pinch your nose gently, and hold it for a short while. To prevent nosebleeds, try massaging a small dab of petroleum jelly, such as Vaseline, in your nostrils. · If your nose is stuffed up, try saline (saltwater) nose sprays. Do not use decongestant sprays. Care for your breasts  · Wear a bra that gives you good support. · Know that changes in your breasts are normal.  ? Your breasts may get larger and more tender. Tenderness usually gets better by 12 weeks. ? Your nipples may get darker and larger, and small bumps around your nipples may show more. ? The veins in your chest and breasts may show more. Where can you learn more? Go to https://M2Z NetworkspeMax-Vizeweb.healthTwelvefold. org and sign in to your [employer] Expert account. Enter N887 in the GiveCorps box to learn more about \"Weeks 10 to 14 of Your Pregnancy: Care Instructions. \"     If you do not have an account, please click on the \"Sign Up Now\" link. Current as of: June 16, 2021               Content Version: 13.1  © 3784-0434 Healthwise, [facility].    Care instructions adapted under license by [facility] ([facility]). If you have questions about a medical condition or this instruction, always ask your healthcare professional. [address] any warranty or liability for your use of this information.
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
## 2022-03-23 LAB — CYSTIC FIBROSIS: NORMAL

## 2022-04-21 ENCOUNTER — ROUTINE PRENATAL (OUTPATIENT)
Dept: OBGYN CLINIC | Age: 27
End: 2022-04-21
Payer: COMMERCIAL

## 2022-04-21 VITALS — SYSTOLIC BLOOD PRESSURE: 110 MMHG | BODY MASS INDEX: 35.51 KG/M2 | DIASTOLIC BLOOD PRESSURE: 60 MMHG | WEIGHT: 220 LBS

## 2022-04-21 DIAGNOSIS — Z3A.18 18 WEEKS GESTATION OF PREGNANCY: Primary | ICD-10-CM

## 2022-04-21 PROCEDURE — 99212 OFFICE O/P EST SF 10 MIN: CPT

## 2022-04-21 ASSESSMENT — ENCOUNTER SYMPTOMS
VOMITING: 1
NAUSEA: 1

## 2022-04-21 NOTE — PROGRESS NOTES
[address] [facility]  [facility] - [address]  [address] [facility] [address]     PCP: No primary care provider on file. Chief Complaint   Patient presents with    Routine Prenatal Visit     OB 18w0d      Shy Mon  is a [id number] who presents to the clinic today at 18w0d for routine prenatal monitoring. She has no unusual complaints. Reports good fetal movement, denies VB, ctx or LOF    Taking magnesium for headaches. Feels this is helping. Taking unisom & b6 which is helping nausea. Vomited this morning. Feels very tired at work. 10 hour shifts are very difficult for her to complete. She is on her feet a lot as an [address]. [facility] recommendations: 5/5/22 anatomy scan scheduled    Early gtt: patient will complete asap  Genetic testing - msafp ordered today. Anatomy scan  Rhogam - will need. Received dose of Rhogam this pregnancy in [place] in January or February    Patient Active Problem List   Diagnosis    Acute cystitis without hematuria    Dysmenorrhea    Anxiety    Rh negative state in antepartum period      Review of Systems   Gastrointestinal: Positive for nausea and vomiting. Genitourinary: Negative for decreased urine volume, difficulty urinating, dyspareunia, dysuria, enuresis, flank pain, frequency, genital sores, hematuria, menstrual problem, pelvic pain, urgency, vaginal bleeding, vaginal discharge and vaginal pain. Neurological: Positive for headaches. Denies unusual headaches, vision changes, RUQ pain    Vitals:    04/21/22 1304   BP: 110/60   Weight: 220 lb (99.8 kg)    FHR: 150  FH: 18    Physical Exam  Constitutional:       General: She is awake. She is not in acute distress. Appearance: Normal appearance. She is well-developed and well-groomed. She is not ill-appearing, toxic-appearing or diaphoretic. Genitourinary:      Urethral meatus normal.   HENT:      Head: Normocephalic and atraumatic. Nose: Nose normal.   Eyes:      Extraocular Movements: Extraocular movements intact. Pulmonary:      Effort: Pulmonary effort is normal.   Musculoskeletal:         General: Normal range of motion. Cervical back: Normal range of motion. Neurological:      General: No focal deficit present. Mental Status: She is alert and oriented to person, place, and time. Mental status is at baseline. Psychiatric:         Attention and Perception: Attention and perception normal.         Mood and Affect: Mood normal.         Speech: Speech normal.         Behavior: Behavior normal. Behavior is cooperative. Thought Content: Thought content normal.         Cognition and Memory: Cognition and memory normal.         Judgment: Judgment normal.   Vitals reviewed. Assessment and Plan: Educated on the importance of vaccines (TDAP, Flu, Covid) in pregnancy. Educated on  labor, signs and symptoms of preeclampsia. Notify office of any decreased movement, vaginal bleeding, loss of fluid or contractions. Wants iud inserted after delivery. RTO 4 weeks or sooner PRN.     The patient was seen face to face and examined today by LINDA Rivero - CNP

## 2022-04-21 NOTE — PROGRESS NOTES
Please have fasting labs and urine next week.   Do not eat any food or drink any beverages for 12 hours before having the testing done. You may have water only; NO candy, gum, mints, coffee, soda or juice.  Please take all medications as usual. Do not drink any alcoholic beverages for 24 hours prior to testing.    Please return to my office for follow up in 6 months.     Please contact central scheduling at [phone] to schedule your CT chest with contrast.     Can use over the counter Debrox ear cleaning drops as directed.      Trace of Ketones on urine dip

## 2022-05-03 ENCOUNTER — TELEPHONE (OUTPATIENT)
Dept: OBGYN CLINIC | Age: 27
End: 2022-05-03

## 2022-05-03 DIAGNOSIS — O99.891 BACK PAIN AFFECTING PREGNANCY, ANTEPARTUM: Primary | ICD-10-CM

## 2022-05-03 DIAGNOSIS — M54.9 BACK PAIN AFFECTING PREGNANCY, ANTEPARTUM: Primary | ICD-10-CM

## 2022-05-03 NOTE — TELEPHONE ENCOUNTER
No answer when I tried to call pt to discuss back pain, nausea  and pelvic/abdominal pain. Patient states in a [employer] message that these symptoms are interfering with work despite work restrictions given previously. Gave labor precautions and education via [employer]. Ordered PT, will help Miracle get set up with a back brace. Encouraged tylenol for pain.

## 2022-05-05 ENCOUNTER — ROUTINE PRENATAL (OUTPATIENT)
Dept: PERINATAL CARE | Age: 27
End: 2022-05-05
Payer: COMMERCIAL

## 2022-05-05 ENCOUNTER — HOSPITAL ENCOUNTER (OUTPATIENT)
Age: 27
Setting detail: SPECIMEN
Discharge: HOME | End: 2022-05-05
Payer: COMMERCIAL

## 2022-05-05 VITALS
SYSTOLIC BLOOD PRESSURE: 120 MMHG | RESPIRATION RATE: 16 BRPM | BODY MASS INDEX: 35.03 KG/M2 | HEIGHT: 66 IN | WEIGHT: 218 LBS | DIASTOLIC BLOOD PRESSURE: 70 MMHG | TEMPERATURE: 97.2 F | HEART RATE: 90 BPM

## 2022-05-05 DIAGNOSIS — Z3A.20 20 WEEKS GESTATION OF PREGNANCY: ICD-10-CM

## 2022-05-05 DIAGNOSIS — O99.212 OBESITY AFFECTING PREGNANCY IN SECOND TRIMESTER: Primary | ICD-10-CM

## 2022-05-05 DIAGNOSIS — Z36.86 ENCOUNTER FOR SCREENING FOR RISK OF PRE-TERM LABOR: ICD-10-CM

## 2022-05-05 LAB
ABDOMINAL CIRCUMFERENCE: NORMAL
ABDOMINAL CIRCUMFERENCE: NORMAL
BIPARIETAL DIAMETER: NORMAL
BIPARIETAL DIAMETER: NORMAL
ESTIMATED FETAL WEIGHT: NORMAL
ESTIMATED FETAL WEIGHT: NORMAL
FEMORAL DIAMETER: NORMAL
FEMORAL DIAMETER: NORMAL
HC/AC: NORMAL
HC/AC: NORMAL
HEAD CIRCUMFERENCE: NORMAL
HEAD CIRCUMFERENCE: NORMAL

## 2022-05-05 PROCEDURE — 36415 COLL VENOUS BLD VENIPUNCTURE: CPT

## 2022-05-05 PROCEDURE — 82105 ALPHA-FETOPROTEIN SERUM: CPT

## 2022-05-05 PROCEDURE — 76817 TRANSVAGINAL US OBSTETRIC: CPT | Performed by: OBSTETRICS & GYNECOLOGY

## 2022-05-05 PROCEDURE — 76811 OB US DETAILED SNGL FETUS: CPT | Performed by: OBSTETRICS & GYNECOLOGY

## 2022-05-05 PROCEDURE — 99999 PR OFFICE/OUTPT VISIT,PROCEDURE ONLY: CPT | Performed by: OBSTETRICS & GYNECOLOGY

## 2022-05-05 NOTE — PROGRESS NOTES
Please refer to NIPT results drawn in primary OB office. Patient has opted for maternal carrier testing ([employer] Carrier Screen). Sent for MSAFP (maternal serum alpha-feto protein level) only lab draw today. Patient declines Maternal-Fetal Medicine consultation at this time regarding the medical/obstetrical complications of pregnancy. Maternal-Fetal Medicine (MFM) attending physician will defer all management for the  medical/obstetrical complications of pregancy to the primary attending obstetrical physician, as a result. Therefore, only an ultrasound evaluation was completed today in the MFM office.

## 2022-05-06 LAB
SEND OUT REPORT: NORMAL
TEST NAME: NORMAL

## 2022-05-08 LAB
AFP INTERPRETATION: NORMAL
AFP MOM: 1.39
AFP SPECIMEN: NORMAL
AFP: 64 NG/ML
DATE OF BIRTH: NORMAL
DATING METHOD: NORMAL
DETERMINED BY: NORMAL
DIABETIC: NEGATIVE
DONOR EGG?: NORMAL
DUE DATE: NORMAL
ESTIMATED DUE DATE: NORMAL
FAMILY HISTORY NTD: NEGATIVE
GESTATIONAL AGE: NORMAL
IN VITRO FERTILIZATION: NORMAL
INSULIN REQ DIABETES: NO
LAST MENSTRUAL PERIOD: NORMAL
MATERNAL AGE AT EDD: 24.9 YR
MATERNAL WEIGHT: 218
MONOCHORIONIC TWINS: NO
NUMBER OF FETUSES: NORMAL
PATIENT WEIGHT UNITS: NORMAL
PATIENT WEIGHT: NORMAL
RACE (MATERNAL): NORMAL
RACE: NORMAL
REPEAT SPECIMEN?: NO
SMOKING: NO
SMOKING: NO
VALPROIC/CARBAMAZEP: NORMAL
ZZ NTE CLEAN UP: HISTORY: NO

## 2022-05-19 ENCOUNTER — ROUTINE PRENATAL (OUTPATIENT)
Dept: OBGYN CLINIC | Age: 27
End: 2022-05-19
Payer: COMMERCIAL

## 2022-05-19 ENCOUNTER — HOSPITAL ENCOUNTER (OUTPATIENT)
Dept: PHYSICAL THERAPY | Facility: CLINIC | Age: 27
Setting detail: THERAPIES SERIES
Discharge: HOME | End: 2022-05-19
Payer: COMMERCIAL

## 2022-05-19 VITALS
BODY MASS INDEX: 35.36 KG/M2 | HEIGHT: 66 IN | DIASTOLIC BLOOD PRESSURE: 69 MMHG | WEIGHT: 220 LBS | SYSTOLIC BLOOD PRESSURE: 106 MMHG

## 2022-05-19 DIAGNOSIS — Z3A.22 22 WEEKS GESTATION OF PREGNANCY: Primary | ICD-10-CM

## 2022-05-19 DIAGNOSIS — Z34.80 SUPERVISION OF OTHER NORMAL PREGNANCY, ANTEPARTUM: ICD-10-CM

## 2022-05-19 PROCEDURE — 1036F TOBACCO NON-USER: CPT | Performed by: STUDENT IN AN ORGANIZED HEALTH CARE EDUCATION/TRAINING PROGRAM

## 2022-05-19 PROCEDURE — 97161 PT EVAL LOW COMPLEX 20 MIN: CPT

## 2022-05-19 PROCEDURE — 97110 THERAPEUTIC EXERCISES: CPT

## 2022-05-19 PROCEDURE — 97530 THERAPEUTIC ACTIVITIES: CPT

## 2022-05-19 PROCEDURE — G8417 CALC BMI ABV UP PARAM F/U: HCPCS | Performed by: STUDENT IN AN ORGANIZED HEALTH CARE EDUCATION/TRAINING PROGRAM

## 2022-05-19 PROCEDURE — G8427 DOCREV CUR MEDS BY ELIG CLIN: HCPCS | Performed by: STUDENT IN AN ORGANIZED HEALTH CARE EDUCATION/TRAINING PROGRAM

## 2022-05-19 PROCEDURE — 99213 OFFICE O/P EST LOW 20 MIN: CPT | Performed by: STUDENT IN AN ORGANIZED HEALTH CARE EDUCATION/TRAINING PROGRAM

## 2022-05-19 NOTE — CONSULTS
[] [facility]) [facility] &  Therapy  [address]  [phone]  F: [phone] [x] [address]  [facility]   [address]  P: [phone]  F: [phone] [] [address]  [address]  P: [phone]  F: [phone] [] [address]  P: [phone]  F: [phone] [] [address]  [facility]   [address]   [place]: [phone]  F: [phone]      Physical Therapy Pelvic Floor Evaluation    Date: 22   Patient: Teofilo Mensah    : 1995 MRN: [id number]  Physician: Luisa Hamilton  Insurance: [facility]  Medical Diagnosis:   O99.891, M54.9 (ICD-10-CM) - Back pain affecting pregnancy, antepartum   Rehab Codes: M54.5, M54.59, M54.9, M79.1, R26.2NEC, R29.3, M62.81, R27.8, R20.2  Onset Date: 22                  Next 's appt.: 22     Subjective:   CC/HPI: Pt is a pleasant 28 y/o female,  (vaginal delivery - currently 3 & 3 y/o - boy/girl, respectively), in her 2nd trimester (boy - 22 wks - due 22) of pregnancy with complaints LBP with sharp/shooting pain that can refer more to R side of pelvis/LB & posterior thigh. She is currently on weight lifting/pushing/pulling restrictions over 15lbs. She also has difficulty with laying on both sides in bed, usually starts on R side and tosses and turns all night - consistent sleep limited to 3-4 hrs without waking. Pt has difficulty with all positions, prolonged sitting/standing, especially at work.  Pt has difficulty walking & getting dressed, getting into/out of car/bed; navigating stairs.     Pt does use pregnancy pillow at night for support but still having trouble with comfort. Pt is not currently with father or having intercourse; she does have a support system - children's grandparents. Pt recently moved here in February 2022 from [place]. 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 Pt denies having  pregnancy support belt. Pt reports she had tearing with 1st child, but is unsure of grade of tear. Pt reports daily BMs. Pt denies hemorrhoids.       Pt reports h/o prior frequent UTI infection hx , but none this pregnancy. She does note increased yeast infection hx, but none during this preganancy. She reports having leakage coughing, laughing,sneezing & effort. Is currently on meds for it, difficulty with urges to the point of leakage. Pt RH - & needs rhogam @ 28 wks      PMHx: []???? Unremarkable []???? Diabetes []???? HTN  []???? Pacemaker  []???? MI/Heart Problems []???? Cancer []???? Arthritis [x]???? Asthma   [x]???? refer to full medical chart In [facility]  []???? Other:       Date of last pap smear and/or vaginal exam (if female): 2/2022 - normal      Tests: [x]???? monitoring by ob/gyn      Medications: [x]???? Refer to full medical record []???? None []???? Other:     Allergies: [x]???? Refer to full medical record []???? None []???? Other:      Function: Hand Dominance [x]???? Right []???? Left     Working: [x]???? 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 Yes []???? No Location: pelvic/ LBP into RLE  Pain Rating: (0-10 scale) 6/10 at worst  Pain altered Tx: []???? Yes [x]???? No Action:     Surgical               History of abdominal surgeries? []???? Yes  [x]? ??? No []???? Other                          If yes, please list:               RBMGTYN of orthopedic surgeries? []???? Yes  [x]? ??? No []???? Other                          If yes, please list:      Gynecological              # of pregnancies/birth (G/P): 4/2              Currently breastfeeding: []???? Yes  [x]? ??? No []???? Other              Type of birth: vaginal               Pelvic floor tear/episotomy (present or absent and grade): unsure tear with 1st born              If vaginal, how long did you have to push: not bad with 2nd 25min, longer with 1st 45min              Any trauma or difficulty with delivery: [x]???? Yes  []???? No []???? Other                          Urinary              General urinary symptoms: ANIBAL, UUI              Frequency of urination (# of times per waking hours): q hour              Nighttime Urination (# of times per night): q hour              Urge Control ( how long can you hold urine once urge strikes): difficult to hold once urges arises              Pain with urination: []???? Yes  [x]? ??? No sometimes               Episodes of leakage (# Per day, per week, etc): inconsistent               Activity during leakage: ANIBAL, urges                History of UTIs (more than 4 in a year):[]???? Yes  [x]? ??? No []???? Other              History of yeast infections (more than 4 year): [x]???? Yes  []???? No []???? Other     Bowel              General bowel symptoms: [x]???? normal depends  []???? Leakage of stool Type:  []???? Leakage of gas []???? none                Frequency of stool (# of bowel movements per  day, week, etc): last BM yesterday              Urge Control (how long can stool be delayed or held): n/a              Pain with bowel movement: []???? Yes  [x]? ??? No []????               Abdominal pain related to bowel []???? Yes  [x]? ??? No []???? Other          Sexual function/Pelvic pain:               Relationship status: []????    [x]? ??? Single  []???? Long term partner  []???? Multiple partners  []???? Other              Type of partner: []???? Female   []???? Male  [x]? ??? Other                           *SLHSCRG name (if relevant):   Sexually active currently?: []???? Yes  [x]? ??? No []???? Other  If yes,    []???? Vaginal  []???? Rectal []???? Other                 Pain with penetration: [x]???? Yes  []???? No []???? Other                          If yes,                                        []???? unable to insert tampon  []???? pain with tampon insertion []???? unable to tolerate pelvic exam with finger  []???? unable to tolerate pelvic exam with speculum [x]???? Other: deeper thrusting infrequent when she was having intercourse, but is currently not                                                    Pain with intercourse: [x]???? Yes  []???? No []???? Other                                      If yes,                                                   []???? Pain with initial penetration  [x]? ??? Pain with deep penetration []???? other                  Able to achieve orgasm: [x]???? Yes  []???? No []???? Other                                      If yes,                                                   [x]? ??? Internal   [x]? ??? External  []???? Other  Musculoskeletal screen:               Any issues with [x]???? Low back  []???? Thoracic  []???? Cervical  [x]? ??? Hip [x]???? Pelvis []???? Other      Health/behavior:              Fiber Intake (g): unknown                PBIXY intake (oz):  water all day               Physically active: []???? Yes  [x]? ??? No []???? Other                          If yes, what current activities do you engage in? no              Dietary Restrictions: n/a               Bladder irritants, etc: YES, pop daily     Psychosocial: denies     Symptoms: []???? Improving [x]???? Worsening []???? Same  Better: rest  Worse: positionally   Sleep: []???? OK [x]???? Disturbed       Objective:    STRENGTH   ROM  50% limitation in all planes; more pain with ext     Left Right Lumbar     L1-2 Hip Flex 4- 4- Flexion     Hip Abd 4- 4- Extension     L3-4 Knee Ext 4- 4- Rotation       L4 Ankle DF 4- 4- Sidebend       L5 EHL 4- 4-       S1 Plant.  Flex 4- 4-          TESTS (+/-) LEFT RIGHT Not Tested   SLR [x]????? sit []????? supine - + []?????    Hamstring (SLR) + + []?????    HS 90/90/ Ely's/ piriformis +/+/+ +/+/+     RAFAT/FADIR/P4 +/-/- +/+/+ []?????    Deana Tests ? Pain ? Pain No Change Not Tested   RFIS []?????  []?????  [x]?????  []?????    MINA [x]?????  []?????  []?????  []?????     +R  forward bending   OBSERVATION No Deficit Deficit Not Tested Comments   Posture           Forward Head []?????  [x]?????  []?????      Rounded Shoulders []?????  [x]?????  []?????      Lordosis []?????  [x]?????  []?????  Increased Anterior tilt due to pregnancy    Iliac Crest []?????  [x]?????  []?????  L elevated IC, L elevated shoulder   Genu Valgus [x]?????  []?????  []?????      Genu Varus [x]?????  []?????  []?????      Genu Recurvatum []?????  [x]?????  []?????   B   Slumped Sitting []?????  [x]?????  []?????      Palpation []?????  [x]?????  []?????  TTP at R lumbar paraspinals &  SIJ   Sensation [x]?????  []?????  []?????      Edema [x]?????  []?????  []?????      Neurological []?????  []?????  [x]?????         FUNCTION Normal Difficult Unable   Sitting []?????  [x]?????  []?????    Standing []?????  [x]?????  []?????    Ambulation []?????  [x]?????  []?????    Groom/Dress []?????  [x]?????  []?????    Lift/Carry []?????  [x]?????  []?????    Stairs []?????  [x]?????  []?????    Bending []?????  [x]?????  []?????    OH reach []?????  [x]?????  []?????    Sit to Stand []?????  [x]?????  []?????      Comments:  Oswestry 27/50 = 54%deficit; R SLS 15sec  L SLS 17 sec with B t-sign & B lateral displacement to achieve due to COG shift; antalgic waddling type gait      Assessment: Pt is a 25 y/o female with LBP due to 2nd trimester pregnancy. Additionally, pt has core weakness due to pregnancy; which likely also contributes to her current sxs, due to excessive anterior weight displacement/poor posture. Pt responded well to PT exam and Kinesiotape application with relief noted.  Pt would benefit from skilled physical therapy services in order to: restore pain free lumbar ROM, increase BLE + core strength, improve postural awareness/body mechanics & control pain until delivery of child. Will provide home exercise program.  Problems:    [x]????? ? Pain  6/10 at worst  [x]????? ? Lumbar ROM: All planes 50% limited, B hip inflexibility  [x]????? ? Strength: throughout core & BLEs  [x]????? ? Function: Oswestry 27/50 = 54%deficit   [x]????? Postural Deviations B Lower Crossed Syndrome -- increased as a result of pregnancy; R SIJ dysfunction           STG: (to be met in 7 treatments)  1. ? Pain: To <5/10 with activity to increase ease with ADLS  2. ? ROM: Lumbar, all planes by 25% improvement to improve daily function. 3. ? Strength: BLE throughout by 1/2 to a full grade to improve standing/walking endurance. 4. ? Sleep Function: to >5 hrs consistently to improve quality of life. 5. Independent with Home Exercise Programs + adherence to tissue health & self massage regimen for birth preparation once in 3rd trimester.   6. Pt to maintain B SLS for 30sec or greater without significant compensations to reduce load transfer and improve SL stability & endurance.     LTG: (to be met in 14 treatments)  1. ? Pain: To < 1-2/10 with activity to increase ease with ADLs  2. ? Strength: BLE throughout to 4+/5 to improve standing/walking endurance.   3. Pt will report Oswestry LBP Score to <20% deficit to ease ADLs & improve quality of life. 4. ? Standing/Walking Function:  for > 60min &/or walking for 1000', with normalized mechanics & without increase in sxs, to improve quality of life.      Patient goals: \"sterngthen back and & rest of body to not be so uncomfortable\"     Rehab Potential:  [x]? ???? Good  []????? Fair  []????? Poor             Suggested Professional Referral:  [x]? ???? No  []????? Yes:  Barriers to Goal Achievement[de-identified]  []????? No  [x]? ???? Yes: Evolving pregnancy   Domestic Concerns:  [x]? ???? No  []????? Yes:     Pt. Education:  [x]????? Plans/Goals, Risks/Benefits discussed  [x]? ???? Home exercise with effort     pregnancy belt                       Semi reclined          Diaphragmatic breathing HEP   Given in semi reclined, next time teach in supported child's pose, sidelying & quadruped   Bent knee fall outs HEP                 Sidelying         Clams NEXT       Hip Flexor stretch  next STRAP                 Seated ball HEP   Circles & child's pose   Seated HS stretch  HEP       Piriformis Stretch HEP        Adductor stretch HEP   Leg off to side             Quadruped                             Half Kneel                             Plantigrade                             Other:educated on: seated posture + lumbar lordosis roll /posture, + LBP, sleep positions, lifting, household activities, Tra contraction/palpation, encourage use of pregnancy belt, kinesiotape benefits/wear/removal, heat/ice, IAP regulation     Specific Instructions for next treatment: Give bladder diary prn, core strengthening, dynamic lumbar stabilization with inclined HOB, BLE strength & flexibility (H/S, hip flexors, piriformis, ITB, gastrocs) ex, progress as tolerated, modalities prn, MFR with tennis balls to gluteals      Evaluation Complexity:  History (Personal factors, comorbidities) []???? 0 []???? 1-2 [x]???? 3+   Exam (limitations, restrictions) []???? 1-2 []???? 3 [x]? ??? 4+   Clinical presentation (progression) []???? Stable [x]???? Evolving  []???? Unstable   Decision Making [x]? ??? Low []???? Moderate []???? High     [x]? ??? Low Complexity []???? Moderate Complexity []???? High Complexity         Treatment Charges: Mins Units   [x]? ? Evaluation (low complex) 30 1   []? ? Modalities         [x]? ? Ther Exercise 15 1   []? ? Manual Therapy       [x]? ? Ther Activities 15 1   []? ? Aquatics         []? ? Vasocompression         []? ? Other             TOTAL TREATMENT TIME: 60min    Time in: 10am Time out: 11am      Electronically signed by: Fina Robles, PT, DPT     Physician Signature:________________________________Date:__________________  By signing above or cosigning this note, I have reviewed this plan of care and certify a need for medically necessary rehabilitation services.        *PLEASE SIGN ABOVE AND FAX BACK ALL PAGES*

## 2022-05-19 NOTE — PROGRESS NOTES
Prenatal Visit    Mateus Hernandez is a 27 y.o. female [id number] at 22w0d    The patient was seen and evaluated. Reports positive fetal movements. She denies headache, vision changes, RUQ pain, contractions, vaginal bleeding and leakage of fluid. The patient already received the influenza vaccine this year. The problem list reflects the active issues addressed during today's visit    Vitals:    BP: 106/69  Weight: 220 lb (99.8 kg)  Fundal Height (cm): 22 cm  Fetal Heart Rate: 158  Movement: Present     Labs: The patient is RH Neg, Rhogam ordered for 28 weeks  ABO/Rh   Date Value Ref Range Status   03/04/2022 A NEGATIVE  Final       Assessment & Plan:  Mateus Hernandez is a 27 y.o. female [id number] at 24w0d   - NIPT low risk, male   - A single marker MSAFP was reviewed and found to be normal.    - The patients anatomy ultrasound has been completed and reviewed with patient.    - Next [facility] appointment 7/14/22   - The ACIP recommended pregnant patients be included in phase 1C of vaccine distribution. This decision is supported by [facility] and ACOG. As of February 16, 2021, there have been over 30,000 pregnant patients included in the V-safe post COVID vaccination safety . Most (73%) reports to VAERS among pregnant women involved non-pregnancyspecific adverse events (e.g., local and systemic reactions). Miscarriage was the most frequently reported pregnancy-specific adverse event to VAERS; numbers are within the known background rates based on presumed COVID-19 vaccine doses administered to pregnant women. No unexpected pregnancy or infant outcomes have been observed related to  COVID-19 vaccination during pregnancy. Recommended patient proceed with vaccination. Booster pending.            Patient Active Problem List    Diagnosis Date Noted    Supervision of other normal pregnancy, antepartum 05/19/2022     Priority: Medium    Rh negative state in antepartum period 03/04/2022     Needs rhogam @ 28 wks  Received early in pregnancy for bleeding      Dysmenorrhea 03/19/2021    Anxiety 03/19/2021    Acute cystitis without hematuria      Return in about 4 weeks (around 6/16/2022) for SHARA Cam, [address] [facility]   5/19/2022, 2:02 PM

## 2022-05-26 ENCOUNTER — HOSPITAL ENCOUNTER (EMERGENCY)
Age: 27
Discharge: HOME | End: 2022-05-26
Attending: EMERGENCY MEDICINE
Payer: COMMERCIAL

## 2022-05-26 VITALS
WEIGHT: 212 LBS | BODY MASS INDEX: 35.32 KG/M2 | HEART RATE: 109 BPM | HEIGHT: 65 IN | SYSTOLIC BLOOD PRESSURE: 94 MMHG | DIASTOLIC BLOOD PRESSURE: 76 MMHG | TEMPERATURE: 98.3 F | OXYGEN SATURATION: 100 % | RESPIRATION RATE: 18 BRPM

## 2022-05-26 DIAGNOSIS — M54.6 ACUTE RIGHT-SIDED THORACIC BACK PAIN: ICD-10-CM

## 2022-05-26 DIAGNOSIS — G43.809 OTHER MIGRAINE WITHOUT STATUS MIGRAINOSUS, NOT INTRACTABLE: Primary | ICD-10-CM

## 2022-05-26 LAB
-: NORMAL
ABSOLUTE EOS #: 0.06 K/UL (ref 0–0.44)
ABSOLUTE IMMATURE GRANULOCYTE: 0.06 K/UL (ref 0–0.3)
ABSOLUTE LYMPH #: 0.86 K/UL (ref 1.1–3.7)
ABSOLUTE MONO #: 0.87 K/UL (ref 0.1–1.2)
ANION GAP SERPL CALCULATED.3IONS-SCNC: 13 MMOL/L (ref 9–17)
BASOPHILS # BLD: 0 % (ref 0–2)
BASOPHILS ABSOLUTE: 0.03 K/UL (ref 0–0.2)
BILIRUBIN URINE: NEGATIVE
BUN BLDV-MCNC: 4 MG/DL (ref 6–20)
CALCIUM SERPL-MCNC: 8.9 MG/DL (ref 8.6–10.4)
CASTS UA: NORMAL /LPF (ref 0–8)
CHLORIDE BLD-SCNC: 102 MMOL/L (ref 98–107)
CO2: 20 MMOL/L (ref 20–31)
COLOR: YELLOW
CREAT SERPL-MCNC: 0.43 MG/DL (ref 0.5–0.9)
EOSINOPHILS RELATIVE PERCENT: 1 % (ref 1–4)
EPITHELIAL CELLS UA: NORMAL /HPF (ref 0–5)
GFR AFRICAN AMERICAN: >60 ML/MIN
GFR NON-AFRICAN AMERICAN: >60 ML/MIN
GFR SERPL CREATININE-BSD FRML MDRD: ABNORMAL ML/MIN/{1.73_M2}
GLUCOSE BLD-MCNC: 82 MG/DL (ref 70–99)
GLUCOSE URINE: NEGATIVE
HCT VFR BLD CALC: 36.2 % (ref 36.3–47.1)
HEMOGLOBIN: 12.2 G/DL (ref 11.9–15.1)
IMMATURE GRANULOCYTES: 1 %
KETONES, URINE: ABNORMAL
LEUKOCYTE ESTERASE, URINE: ABNORMAL
LYMPHOCYTES # BLD: 7 % (ref 24–43)
MCH RBC QN AUTO: 30.3 PG (ref 25.2–33.5)
MCHC RBC AUTO-ENTMCNC: 33.7 G/DL (ref 28.4–34.8)
MCV RBC AUTO: 89.8 FL (ref 82.6–102.9)
MONOCYTES # BLD: 7 % (ref 3–12)
NITRITE, URINE: NEGATIVE
NRBC AUTOMATED: 0 PER 100 WBC
PDW BLD-RTO: 14.9 % (ref 11.8–14.4)
PH UA: 7.5 (ref 5–8)
PLATELET # BLD: 175 K/UL (ref 138–453)
PMV BLD AUTO: 10.1 FL (ref 8.1–13.5)
POTASSIUM SERPL-SCNC: 3.6 MMOL/L (ref 3.7–5.3)
PROTEIN UA: NEGATIVE
RBC # BLD: 4.03 M/UL (ref 3.95–5.11)
RBC # BLD: ABNORMAL 10*6/UL
RBC UA: NORMAL /HPF (ref 0–4)
SEG NEUTROPHILS: 84 % (ref 36–65)
SEGMENTED NEUTROPHILS ABSOLUTE COUNT: 9.88 K/UL (ref 1.5–8.1)
SODIUM BLD-SCNC: 135 MMOL/L (ref 135–144)
SPECIFIC GRAVITY UA: 1.01 (ref 1–1.03)
TURBIDITY: CLEAR
URINE HGB: NEGATIVE
UROBILINOGEN, URINE: NORMAL
WBC # BLD: 11.8 K/UL (ref 3.5–11.3)
WBC UA: NORMAL /HPF (ref 0–5)

## 2022-05-26 PROCEDURE — 93005 ELECTROCARDIOGRAM TRACING: CPT | Performed by: STUDENT IN AN ORGANIZED HEALTH CARE EDUCATION/TRAINING PROGRAM

## 2022-05-26 PROCEDURE — 2580000003 HC RX 258: Performed by: STUDENT IN AN ORGANIZED HEALTH CARE EDUCATION/TRAINING PROGRAM

## 2022-05-26 PROCEDURE — 96374 THER/PROPH/DIAG INJ IV PUSH: CPT

## 2022-05-26 PROCEDURE — 85025 COMPLETE CBC W/AUTO DIFF WBC: CPT

## 2022-05-26 PROCEDURE — 6360000002 HC RX W HCPCS: Performed by: STUDENT IN AN ORGANIZED HEALTH CARE EDUCATION/TRAINING PROGRAM

## 2022-05-26 PROCEDURE — 80048 BASIC METABOLIC PNL TOTAL CA: CPT

## 2022-05-26 PROCEDURE — 99284 EMERGENCY DEPT VISIT MOD MDM: CPT

## 2022-05-26 PROCEDURE — 96375 TX/PRO/DX INJ NEW DRUG ADDON: CPT

## 2022-05-26 PROCEDURE — 96361 HYDRATE IV INFUSION ADD-ON: CPT

## 2022-05-26 PROCEDURE — 81001 URINALYSIS AUTO W/SCOPE: CPT

## 2022-05-26 RX ORDER — METOCLOPRAMIDE HYDROCHLORIDE 5 MG/ML
10 INJECTION INTRAMUSCULAR; INTRAVENOUS ONCE
Status: COMPLETED | OUTPATIENT
Start: 2022-05-26 | End: 2022-05-26

## 2022-05-26 RX ORDER — DIPHENHYDRAMINE HYDROCHLORIDE 50 MG/ML
25 INJECTION INTRAMUSCULAR; INTRAVENOUS ONCE
Status: COMPLETED | OUTPATIENT
Start: 2022-05-26 | End: 2022-05-26

## 2022-05-26 RX ORDER — 0.9 % SODIUM CHLORIDE 0.9 %
1000 INTRAVENOUS SOLUTION INTRAVENOUS ONCE
Status: COMPLETED | OUTPATIENT
Start: 2022-05-26 | End: 2022-05-26

## 2022-05-26 RX ADMIN — SODIUM CHLORIDE 1000 ML: 9 INJECTION, SOLUTION INTRAVENOUS at 19:00

## 2022-05-26 RX ADMIN — DIPHENHYDRAMINE HYDROCHLORIDE 25 MG: 50 INJECTION, SOLUTION INTRAMUSCULAR; INTRAVENOUS at 18:56

## 2022-05-26 RX ADMIN — METOCLOPRAMIDE 10 MG: 5 INJECTION, SOLUTION INTRAMUSCULAR; INTRAVENOUS at 18:57

## 2022-05-26 ASSESSMENT — ENCOUNTER SYMPTOMS
COUGH: 0
SORE THROAT: 1
BACK PAIN: 1
SHORTNESS OF BREATH: 0
ABDOMINAL PAIN: 0
WHEEZING: 0
VOMITING: 0
NAUSEA: 0

## 2022-05-26 ASSESSMENT — PAIN SCALES - GENERAL
PAINLEVEL_OUTOF10: 2
PAINLEVEL_OUTOF10: 8

## 2022-05-26 ASSESSMENT — PAIN DESCRIPTION - LOCATION: LOCATION: BACK

## 2022-05-26 ASSESSMENT — PAIN - FUNCTIONAL ASSESSMENT
PAIN_FUNCTIONAL_ASSESSMENT: 0-10
PAIN_FUNCTIONAL_ASSESSMENT: 0-10

## 2022-05-26 NOTE — ED PROVIDER NOTES
[facility] ED  Emergency Department Encounter  EmergencyMedicine Resident     Pt Name:Glenis Eli  MRN: [id number]  [place] 1995  Date of evaluation: 5/26/22  PCP:  No primary care provider on file. CHIEF COMPLAINT       Chief Complaint   Patient presents with    Back Pain     upper    Migraine    Nausea       HISTORY OF PRESENT ILLNESS  (Location/Symptom, Timing/Onset, Context/Setting, Quality, Duration, Modifying Factors, Severity.)    This patient was evaluated in the Emergency Department for symptoms described in the history of present illness. He/she was evaluated in the context of the global COVID-19 pandemic, which necessitated consideration that the patient might be at risk for infection with the SARS-CoV-2 virus that causes COVID-19. Institutional protocols and algorithms that pertain to the evaluation of patients at risk for COVID-19 are in a state of rapid change based on information released by regulatory bodies including the CDC and federal and state organizations. These policies and algorithms were followed during the patient's care in the ED. Chasity García is a 27 y.o. female who is G4, P2, A1 and currently 24 weeks gestation presenting emergency department with complaints of right-sided back pain and thoracic region as well as migraine. Both back pain and migraine started this morning a few hours after waking. Back pain has been constant. Denies any traumatic injuries. Slightly worse with movement. Currently constant. No radiation. Headache is typical for migraines but slightly worse than usual.  Not worst headache of her life. No associated vision changes. Does have some lightheadedness. Tried Tylenol approximately 3 hours prior to arrival with minimal improvement. Is on doxylamine and B6 for morning sickness. Does note still having nausea vomit at least once a day but otherwise tolerating p.o. intake.   No abdominal pain, vaginal bleeding, vaginal discharge, urinary symptoms, numbness, tingling, weakness. Did take home COVID test that was negative. Of note patient has had back pain in the past similar to this. Currently scheduled to go to physical therapy. PAST MEDICAL / SURGICAL / SOCIAL / FAMILY HISTORY      has a past medical history of Asthma, Chronic kidney disease, Complication of anesthesia, Neurologic disorder, Postpartum depression, Stress incontinence, and Trauma. has a past surgical history that includes [place] tooth extraction and Tympanostomy tube placement. Social History     Socioeconomic History    Marital status: Single     Spouse name: Not on file    Number of children: Not on file    Years of education: Not on file    Highest education level: Not on file   Occupational History    Not on file   Tobacco Use    Smoking status: Never Smoker    Smokeless tobacco: Never Used   Vaping Use    Vaping Use: Never used   Substance and Sexual Activity    Alcohol use: Not Currently    Drug use: Not Currently    Sexual activity: Not Currently     Partners: Male   Other Topics Concern    Not on file   Social History Narrative    Not on file     Social Determinants of Health     Financial Resource Strain:     Difficulty of Paying Living Expenses: Not on file   Food Insecurity:     Worried About Running Out of Food in the Last Year: Not on file    Thalia of Food in the Last Year: Not on file   Transportation Needs:     Lack of Transportation (Medical): Not on file    Lack of Transportation (Non-Medical):  Not on file   Physical Activity:     Days of Exercise per Week: Not on file    Minutes of Exercise per Session: Not on file   Stress:     Feeling of Stress : Not on file   Social Connections:     Frequency of Communication with Friends and Family: Not on file    Frequency of Social Gatherings with Friends and Family: Not on file    Attends Mosque Services: Not on file    Active Member of Clubs or Organizations: Not on file    Attends Club or Organization Meetings: Not on file    Marital Status: Not on file   Intimate Partner Violence:     Fear of Current or Ex-Partner: Not on file    Emotionally Abused: Not on file    Physically Abused: Not on file    Sexually Abused: Not on file   Housing Stability:     Unable to Pay for Housing in the Last Year: Not on file    Number of Nataliiamojessica in the Last Year: Not on file    Unstable Housing in the Last Year: Not on file       Family History   Problem Relation Age of Onset    Other Mother         emergency surgery postpartum hemorrhage, DDD    No Known Problems Father     No Known Problems Sister     Diabetes type 2  Maternal Grandmother         borderline    Hypertension Maternal Grandmother     No Known Problems Maternal Grandfather     No Known Problems Paternal Grandmother     No Known Problems Paternal Grandfather     Breast Cancer Paternal Great Grandmother 79    Breast Cancer Maternal Great Grandmother     Colon Cancer Neg Hx     Uterine Cancer Neg Hx     Ovarian Cancer Neg Hx        Allergies:  Patient has no known allergies. Home Medications:  Prior to Admission medications    Medication Sig Start Date End Date Taking? Authorizing Provider   magnesium oxide (MAG-OX) 400 (241.3 Mg) MG TABS tablet  3/19/22   Historical Provider, MD   Prenatal MV-Min-Fe Fum-FA-DHA (PRENATAL 1 PO) Take 1 tablet by mouth daily    Historical Provider, MD   doxyLAMINE succinate (UNISOM SLEEPTABS) 25 MG tablet Take 1 tablet by mouth nightly 3/18/22   LINDA Gonzalez CNP   vitamin B-6 (PYRIDOXINE) 50 MG tablet Take 1 tablet by mouth in the morning and at bedtime 3/18/22   LINDA Gonzalez CNP       REVIEW OF SYSTEMS    (2-9 systems for level 4, 10 or more for level 5)      Review of Systems   Constitutional: Positive for fatigue. Negative for chills and fever. HENT: Positive for sore throat (Resolved). Negative for congestion.     Eyes: Negative for visual disturbance. Respiratory: Negative for cough, shortness of breath and wheezing. Cardiovascular: Negative for chest pain. Gastrointestinal: Negative for abdominal pain, nausea and vomiting. Genitourinary: Negative for dysuria, hematuria, vaginal bleeding and vaginal discharge. Musculoskeletal: Positive for back pain. Negative for neck pain and neck stiffness. Skin: Negative for rash. Neurological: Positive for headaches. Negative for dizziness, weakness and numbness. Hematological: Does not bruise/bleed easily. PHYSICAL EXAM   (up to 7 for level 4, 8 or more for level 5)      INITIAL VITALS:   BP 94/76   Pulse (!) 109   Temp 98.3 °F (36.8 °C) (Oral)   Resp 18   Ht 5' 5\" (1.651 m)   Wt 212 lb (96.2 kg)   LMP 11/26/2021   SpO2 100%   BMI 35.28 kg/m²     Physical Exam  Constitutional:       General: She is not in acute distress. Appearance: She is obese. She is not toxic-appearing. HENT:      Head: Normocephalic and atraumatic. Mouth/Throat:      Mouth: Mucous membranes are dry. Eyes:      Extraocular Movements: Extraocular movements intact. Conjunctiva/sclera: Conjunctivae normal.      Pupils: Pupils are equal, round, and reactive to light. Neck:      Comments: No meningismus  Cardiovascular:      Rate and Rhythm: Regular rhythm. Tachycardia present. Pulses: Normal pulses. Pulmonary:      Effort: Pulmonary effort is normal. No respiratory distress. Breath sounds: No stridor. No wheezing, rhonchi or rales. Abdominal:      General: There is no distension. Palpations: Abdomen is soft. Tenderness: There is no abdominal tenderness. There is no guarding. Comments: Gravid abdomen. Nontender. Musculoskeletal:         General: No swelling or deformity. Normal range of motion. Cervical back: Normal range of motion and neck supple. No muscular tenderness. Right lower leg: No edema. Left lower leg: No edema.       Comments: Right paraspinal muscle tenderness in mid thoracic region. No overlying rash. No point tenderness. No step-offs or deformities. Skin:     General: Skin is warm and dry. Findings: No rash. Neurological:      Mental Status: She is alert and oriented to person, place, and time. Comments: 5/5 strength in upper and lower extremities.    Psychiatric:         Behavior: Behavior normal.         DIFFERENTIAL  DIAGNOSIS     PLAN (LABS / IMAGING / EKG):  Orders Placed This Encounter   Procedures    CBC with Auto Differential    Basic Metabolic Panel w/ Reflex to MG    Urinalysis with Reflex to Culture    Microscopic Urinalysis       MEDICATIONS ORDERED:  Orders Placed This Encounter   Medications    0.9 % sodium chloride bolus    metoclopramide (REGLAN) injection 10 mg    diphenhydrAMINE (BENADRYL) injection 25 mg         DIAGNOSTIC RESULTS / EMERGENCY DEPARTMENT COURSE / MDM     Results for orders placed or performed during the hospital encounter of 05/26/22   CBC with Auto Differential   Result Value Ref Range    WBC 11.8 (H) 3.5 - 11.3 k/uL    RBC 4.03 3.95 - 5.11 m/uL    Hemoglobin 12.2 11.9 - 15.1 g/dL    Hematocrit 36.2 (L) 36.3 - 47.1 %    MCV 89.8 82.6 - 102.9 fL    MCH 30.3 25.2 - 33.5 pg    MCHC 33.7 28.4 - 34.8 g/dL    RDW 14.9 (H) 11.8 - 14.4 %    Platelets 677 725 - 319 k/uL    MPV 10.1 8.1 - 13.5 fL    NRBC Automated 0.0 0.0 per 100 WBC    Seg Neutrophils 84 (H) 36 - 65 %    Lymphocytes 7 (L) 24 - 43 %    Monocytes 7 3 - 12 %    Eosinophils % 1 1 - 4 %    Basophils 0 0 - 2 %    Immature Granulocytes 1 (H) 0 %    Segs Absolute 9.88 (H) 1.50 - 8.10 k/uL    Absolute Lymph # 0.86 (L) 1.10 - 3.70 k/uL    Absolute Mono # 0.87 0.10 - 1.20 k/uL    Absolute Eos # 0.06 0.00 - 0.44 k/uL    Basophils Absolute 0.03 0.00 - 0.20 k/uL    Absolute Immature Granulocyte 0.06 0.00 - 0.30 k/uL    RBC Morphology ANISOCYTOSIS PRESENT    Basic Metabolic Panel w/ Reflex to MG   Result Value Ref Range    Glucose 82 70 - 99 mg/dL    BUN 4 (L) 6 - 20 mg/dL    CREATININE 0.43 (L) 0.50 - 0.90 mg/dL    Calcium 8.9 8.6 - 10.4 mg/dL    Sodium 135 135 - 144 mmol/L    Potassium 3.6 (L) 3.7 - 5.3 mmol/L    Chloride 102 98 - 107 mmol/L    CO2 20 20 - 31 mmol/L    Anion Gap 13 9 - 17 mmol/L    GFR Non-African American >60 >60 mL/min    GFR African American >60 >60 mL/min    GFR Comment         Urinalysis with Reflex to Culture    Specimen: Urine   Result Value Ref Range    Color, UA Yellow Yellow    Turbidity UA Clear Clear    Glucose, Ur NEGATIVE NEGATIVE    Bilirubin Urine NEGATIVE NEGATIVE    Ketones, Urine LARGE (A) NEGATIVE    Specific Gravity, UA 1.011 1.005 - 1.030    Urine Hgb NEGATIVE NEGATIVE    pH, UA 7.5 5.0 - 8.0    Protein, UA NEGATIVE NEGATIVE    Urobilinogen, Urine Normal Normal    Nitrite, Urine NEGATIVE NEGATIVE    Leukocyte Esterase, Urine TRACE (A) NEGATIVE   Microscopic Urinalysis   Result Value Ref Range    -          WBC, UA 2 TO 5 0 - 5 /HPF    RBC, UA 0 TO 2 0 - 4 /HPF    Casts UA  0 - 8 /LPF     0 TO 2 HYALINE Reference range defined for non-centrifuged specimen. Epithelial Cells UA 5 TO 10 0 - 5 /HPF         RADIOLOGY:  No orders to display     IMPRESSION/MDM/EMERGENCY DEPARTMENT COURSE:  Patient came to emergency department, HPI and physical exam were conducted. All nursing notes were reviewed. 61-year-old female presenting the emergency department with right thoracic back pain and migraine headache. Not worst headache of her life. History of migraines but this is one of the more severe ones. Tried Tylenol prior to arrival with minimal improvement. Denies any traumatic injuries resulting in back pain. Has had back pain similar this in the past but this episode started today. Differential includes strain, sprain, dehydration, morning sickness, migraine headache. Low suspicion for subarachnoid hemorrhage does not be criteria for CT head at this time. Normal neuro exam.  Not worst headache of life. Will give Reglan, Benadryl, fluids. Evaluate for improving tachycardia and headache. Low suspicion for infectious etiology. Also change urinalysis. Point-of-care ultrasound. ED Course as of 05/27/22 0309   Thu May 26, 2022   1900 Point-of-care ultrasound showing fetal heart tones of 164, good fetal movement, appropriate amniotic fluid. [ZT]      ED Course User Index  [ZT] Fiona Cueva DO       Patient's pain significantly improved after Reglan and Benadryl. Urinalysis negative. Rating pain 2 out of 10. I discussed plans to discharge home with patient is comfortable. She is comfortable with plan at this time. Discuss strict return precautions and importance of follow-up with OB/GYN team.  Discharged home. FINAL IMPRESSION      1. Other migraine without status migrainosus, not intractable    2. Acute right-sided thoracic back pain          DISPOSITION / PLAN     DISPOSITION        PATIENT REFERRED TO:  No follow-up provider specified.     DISCHARGE MEDICATIONS:  Discharge Medication List as of 5/26/2022  8:48 PM          Fiona Cueva DO  Emergency Medicine Resident    (Please note that portions of thisnote were completed with a voice recognition program.  Efforts were made to edit the dictations but occasionally words are mis-transcribed.)       Fiona Cueva DO  Resident  05/27/22 1177

## 2022-05-26 NOTE — ED TRIAGE NOTES
Pt ambulated to room 20 from triage with c/o of a migraine, back pain, and nausea going on for one day. Pt is also 24 weeks pregnant. Pt states she had a miscarriage last year. Pt denies having any complications during this pregnancy so far. Pt hasn't been able to eat anything and hold fluids without throwing up. Pt respirations are even and unlabored, pt is oriented X 4, speaking in complete sentences, bed is in the lowest position, call light is within reach. Will continue to monitor.

## 2022-05-26 NOTE — ED PROVIDER NOTES
[facility]     Emergency Department     Faculty Attestation        I performed a history and physical examination of the patient and discussed management with the resident. I reviewed the residents note and agree with the documented findings and plan of care. Any areas of disagreement are noted on the chart. I was personally present for the key portions of any procedures. I have documented in the chart those procedures where I was not present during the key portions. I have reviewed the emergency nurses triage note. I agree with the chief complaint, past medical history, past surgical history, allergies, medications, social and family history as documented unless otherwise noted below. For Physician Assistant/ Nurse Practitioner cases/documentation I have personally evaluated this patient and have completed at least one if not all key elements of the E/M (history, physical exam, and MDM). Additional findings are as noted. Vital Signs: BP: 118/73  Heart Rate: (!) 123  Resp: 18  Temp: 98.3 °F (36.8 °C) SpO2: 98 %  PCP:  No primary care provider on file. Pertinent Comments:         Critical Care  None    This patient was evaluated in the Emergency Department for symptoms described in the history of present illness. He/she was evaluated in the context of the global COVID-19 pandemic, which necessitated consideration that the patient might be at risk for infection with the SARS-CoV-2 virus that causes COVID-19. Institutional protocols and algorithms that pertain to the evaluation of patients at risk for COVID-19 are in a state of rapid change based on information released by regulatory bodies including the CDC and federal and state organizations. These policies and algorithms were followed during the patient's care in the ED.     (Please note that portions of this note were completed with a voice recognition program. Efforts were made to edit the dictations but occasionally words are mis-transcribed.  Whenever words are used in this note in any gender, they shall be construed as though they were used in the gender appropriate to the circumstances; and whenever words are used in this note in the singular or plural form, they shall be construed as though they were used in the form appropriate to the circumstances.)    MD Annie Ray  Attending Emergency Medicine Physician           Margaret Triplett MD  05/26/22 [id number]

## 2022-05-28 LAB
EKG ATRIAL RATE: 114 BPM
EKG P AXIS: 22 DEGREES
EKG P-R INTERVAL: 158 MS
EKG Q-T INTERVAL: 320 MS
EKG QRS DURATION: 78 MS
EKG QTC CALCULATION (BAZETT): 441 MS
EKG T AXIS: 23 DEGREES
EKG VENTRICULAR RATE: 114 BPM

## 2022-06-02 ENCOUNTER — HOSPITAL ENCOUNTER (OUTPATIENT)
Dept: PHYSICAL THERAPY | Facility: CLINIC | Age: 27
Setting detail: THERAPIES SERIES
Discharge: HOME | End: 2022-06-02
Payer: COMMERCIAL

## 2022-06-02 PROCEDURE — 97140 MANUAL THERAPY 1/> REGIONS: CPT

## 2022-06-02 PROCEDURE — 97110 THERAPEUTIC EXERCISES: CPT

## 2022-06-02 NOTE — FLOWSHEET NOTE
[] [facility]) [facility] C[facility] &  Therapy  [address]  [phone]  F: [phone] [x] [address]  [facility]   [address]  P: [phone]  F: [phone] [] [address]  [address]  P: [phone]  F: [phone] [] [address]  P: [phone]  F: [phone] [] [address]  [facility]   [address]   [place]: [phone]  F: [phone]      Physical Therapy Daily Treatment Note    Date:  2022  Patient Name:  Christian Parra    :  1995  MRN: [id number]  Physician: Tony Alvarez                      Insurance: [facility]  Medical Diagnosis:   O99.891, M54.9 (ICD-10-CM) - Back pain affecting pregnancy, antepartum   Rehab Codes: M54.5, M54.59, M54.9, M79.1, R26.2NEC, R29.3, M62.81, R27.8, R20.2  Onset Date: 22                                             Next 's appt.: 22  Visit# / total visits: 2     Cancels/No Shows: 0/1    Subjective:    Pain:  [x] Yes  [] No Location: R sided thoracic pain  Pain Rating: (0-10 scale) 5/10  Pain altered Tx:  [x] No  [] Yes  Action:    Comments: pt states her pain to be isolated to R lateral thoracic region. States that his is typically where her pain complaints are and has been worse with pregnancy. Objective:  Modalities: Kinesiotape: 2 Horizonal I's for abdominal support from iliac crest to iliac crest, 2 Long Vertical I strip along linea alba- did not reapply as pt states this made her feel too hot.    Precautions:  Exercises: Billing[employer] access code: [email or web address]  Exercise Reps/ Time Weight/ Level Comments   Nustep            Pelvic model     Squatty potty   Log rolling  Pillows under knees     RECLINED POSITION FOR ALL EXERCISE     FOCUS ON RELAXATION- filling up the entire abdomen + PF cavity allowing PF to natural bulge       goal is to be at 8 sec count with stream  minimize just in case peeing  kegel with effort, transitions  keep ribs over pelvis  heel raises or toe curls with lower urges  5 quick flicks with lower urgency     exhale with effort      pregnancy belt                       Semi reclined          Diaphragmatic breathing HEP   Given in semi reclined, next time teach in supported child's pose, sidelying & quadruped   Bent knee fall outs HEP                 Sidelying         Clams NEXT       Hip Flexor stretch  next STRAP                 Seated ball HEP   Circles & child's pose   Seated HS stretch  HEP       Piriformis Stretch HEP        Adductor stretch HEP   Leg off to side             Quadruped         kendrick pose 1'ea   Fwd, L, R             Seated:          trunk flexion stretch with stability ball 5x ea  5\" Fwd, L, R   Modified thread the needle 3x ea 15\" hold    Pelvic brace 3'    needs review   Plantigrade                             Other: manual: with hypervolt to R thoracic spine/ scapular area x20' in sitting on stability ball, resting arms on elevated plinth. Treatment Charges: Mins Units   []  Modalities     [x]  Ther Exercise 23 2   [x]  Manual Therapy 20 1   []  Ther Activities     []  Aquatics     []  Vasocompression     []  Other     Total Treatment time 43 3       Assessment: [x] Progressing toward goals. Started session with manual using hypervolt to address pt's pain complaints. Pt notes improvement to pain and tenderness following manual. Added stretches to further address R thoracic spine. Pt requires increased cuing for seated pelvic brace for pressure management proper TA activation. Will continue to review this in future sessions. Pt tolerates all well and reports overall improvement following treatment. Issued full HEP with updates added. Pt encouraged to be compliant for better results. [] No change.      [] Other:  [x] Patient would continue to benefit from skilled physical therapy services in order to: restore pain free lumbar ROM, increase BLE  + core strength, improve postural awareness/body mechanics & control pain until delivery of child. Problems:    [x]?????? ? Pain  6/10 at worst  [x]?????? ? Lumbar ROM: All planes 50% limited, B hip inflexibility  [x]?????? ? Strength: throughout core & BLEs  [x]?????? ? Function: Oswestry 27/50 = 54%deficit   [x]?????? Postural Deviations B Lower Crossed Syndrome -- increased as a result of pregnancy; R SIJ dysfunction           STG: (to be met in 7 treatments)  1. ? Pain: To <5/10 with activity to increase ease with ADLS  2. ? ROM: Lumbar, all planes by 25% improvement to improve daily function. 3. ? Strength: BLE throughout by 1/2 to a full grade to improve standing/walking endurance. 4. ? Sleep Function: to >5 hrs consistently to improve quality of life. 5. Independent with Home Exercise Programs + adherence to tissue health & self massage regimen for birth preparation once in 3rd trimester.   6. Pt to maintain B SLS for 30sec or greater without significant compensations to reduce load transfer and improve SL stability & endurance.     LTG: (to be met in 14 treatments)  1. ? Pain: To < 1-2/10 with activity to increase ease with ADLs  2. ? Strength: BLE throughout to 4+/5 to improve standing/walking endurance.   3. Pt will report Oswestry LBP Score to <20% deficit to ease ADLs & improve quality of life. 4. ? Standing/Walking Function:  for > 60min &/or walking for 1000', with normalized mechanics & without increase in sxs, to improve quality of life.      Patient goals: \"sterngthen back and & rest of body to not be so uncomfortable\"    Pt. Education:  [x] Yes  [] No  [x] Reviewed Prior HEP/Ed  Method of Education: [x] Verbal  [x] Demo  [x] Written- updated bolded below 6/2  Access Code: [email or web address]  URL: [email or web address]  Date: 06/02/2022  Prepared by: JAMES Deleon    Program Notes  Squatty potty   Log rolling  Pillows between knees    RECLINED POSITION FOR ALL EXERCISE    FOCUS ON RELAXATION- filling up the entire abdomen + PF cavity allowing PF to natural bulge       goal is to be at 8 sec count with stream  minimize just in case peeing  kegel with effort, transitions  keep ribs over pelvis  heel raises or toe curls with lower urges  5 quick flicks with lower urgency     exhale with effort     pregnancy belt      Exercises  1 Minute Guided Meditation - 1 x daily - 7 x weekly - 1 sets - 1 reps - 1 hold  Supine Diaphragmatic Breathing with Pelvic Floor Lengthening - 1 x daily - 7 x weekly - 1 sets - 10 reps - 5 hold  Bent Knee Fallouts - 1 x daily - 7 x weekly - 1 sets - 10 reps - 1-2 hold  Clamshell - 1 x daily - 7 x weekly - 1 sets - 10 reps  Seated Hamstring Stretch - 1 x daily - 7 x weekly - 1 sets - 2-3 reps - 45 hold  Seated Piriformis Stretch - 1 x daily - 7 x weekly - 1 sets - 2-3 reps - 45 hold  Seated Hip Adductor Stretch - 1 x daily - 7 x weekly - 1 sets - 2-3 reps - 45 hold  Seated Flexion Stretch with Swiss Ball - 1 x daily - 7 x weekly - 1 sets - 10 reps  Seated Swiss Ball Pelvic Circles - 1 x daily - 7 x weekly - 1 sets - 10 reps  Supine Lower Trunk Rotation - 1 x daily - 7 x weekly - 1 sets - 10 reps  Sidelying Quadriceps Stretch - 1 x daily - 7 x weekly - 1 sets - 1 reps - 45 hold  Cat-Camel - 1 x daily - 7 x weekly - 1 sets - 10 reps  Child's Pose with Thread the Needle - 1 x daily - 7 x weekly - 3 reps - 15 seconds hold  Child's Pose Stretch - 1 x daily - 7 x weekly - 1 minute hold  Child's Pose with Sidebending - 1 x daily - 7 x weekly - 1 minute hold  Seated Transversus Abdominis Bracing - 1 x daily - 7 x weekly - 2 sets - 10 reps    Patient Education  Urinary Incontinence  Office Posture  Sleep Positions  Household Activities  Low Back Pain Handout  Treating Persistent Pain Without Medications: Relaxation  Heat  Ice  Kyphosis/Lordosis Posture  Comprehension of Education:  [x] Verbalizes understanding. [x] Demonstrates understanding. [x] Needs review as pt admits she has not looked at previously issued HEP. [] Demonstrates/verbalizes HEP/Ed previously given. Plan: [x] Continue current frequency toward long and short term goals.     [x] Specific Instructions for subsequent treatments: Give bladder diary prn, core strengthening, dynamic lumbar stabilization with inclined HOB, BLE strength & flexibility (H/S, hip flexors, piriformis, ITB, gastrocs) ex, progress as tolerated, modalities prn, MFR with tennis balls to gluteals      Time In: 5:05pm            Time Out: 5:50pm    Electronically signed by:  Hillary Mancini PTA

## 2022-06-09 ENCOUNTER — HOSPITAL ENCOUNTER (OUTPATIENT)
Dept: PHYSICAL THERAPY | Facility: CLINIC | Age: 27
Setting detail: THERAPIES SERIES
Discharge: HOME | End: 2022-06-09
Payer: COMMERCIAL

## 2022-06-09 NOTE — FLOWSHEET NOTE
[] [address]  [address]    [phone]  F: [phone]   [x] [address]  [facility] 36   [address]  P: [phone]  F: [phone]  [] [address] &  Therapy  [address]  P: [phone]  F: [phone] [] [address]  P: [phone]  F: [phone]  [] [address]  [address] [facility] [address]   [address]   [place]: [phone]  F: [phone]   [] [facility]  [address]  [place]: [phone]   F: [phone]     Physical Therapy Cancel/No Show note    Date: 2022  Patient: Tarah Escalante  : 1995  MRN: [id number]    Cancels/No Shows to date:     For today's appointment patient:    [x]  Cancelled    [] Rescheduled appointment    [] No-show     Reason given by patient:    []  Patient ill    []  Conflicting appointment    [] No transportation      [] Conflict with work    [x] No reason given    [] Weather related    [] COVID-19    [] Other:      Comments:        [x] Next appointment was confirmed.      Electronically signed by: Rosa M Holm PT

## 2022-06-16 ENCOUNTER — ROUTINE PRENATAL (OUTPATIENT)
Dept: OBGYN CLINIC | Age: 27
End: 2022-06-16
Payer: COMMERCIAL

## 2022-06-16 ENCOUNTER — APPOINTMENT (OUTPATIENT)
Dept: PHYSICAL THERAPY | Facility: CLINIC | Age: 27
End: 2022-06-16
Payer: COMMERCIAL

## 2022-06-16 VITALS
SYSTOLIC BLOOD PRESSURE: 106 MMHG | BODY MASS INDEX: 37.01 KG/M2 | HEART RATE: 102 BPM | DIASTOLIC BLOOD PRESSURE: 70 MMHG | WEIGHT: 222.4 LBS

## 2022-06-16 DIAGNOSIS — Z34.80 SUPERVISION OF OTHER NORMAL PREGNANCY, ANTEPARTUM: ICD-10-CM

## 2022-06-16 DIAGNOSIS — Z3A.26 26 WEEKS GESTATION OF PREGNANCY: Primary | ICD-10-CM

## 2022-06-16 DIAGNOSIS — F33.2 SEVERE EPISODE OF RECURRENT MAJOR DEPRESSIVE DISORDER, WITHOUT PSYCHOTIC FEATURES (HCC): ICD-10-CM

## 2022-06-16 PROBLEM — F33.9 EPISODE OF RECURRENT MAJOR DEPRESSIVE DISORDER (HCC): Status: ACTIVE | Noted: 2022-06-16

## 2022-06-16 PROCEDURE — G8417 CALC BMI ABV UP PARAM F/U: HCPCS | Performed by: STUDENT IN AN ORGANIZED HEALTH CARE EDUCATION/TRAINING PROGRAM

## 2022-06-16 PROCEDURE — 99213 OFFICE O/P EST LOW 20 MIN: CPT | Performed by: STUDENT IN AN ORGANIZED HEALTH CARE EDUCATION/TRAINING PROGRAM

## 2022-06-16 PROCEDURE — G8427 DOCREV CUR MEDS BY ELIG CLIN: HCPCS | Performed by: STUDENT IN AN ORGANIZED HEALTH CARE EDUCATION/TRAINING PROGRAM

## 2022-06-16 PROCEDURE — 1036F TOBACCO NON-USER: CPT | Performed by: STUDENT IN AN ORGANIZED HEALTH CARE EDUCATION/TRAINING PROGRAM

## 2022-06-16 RX ORDER — SERTRALINE HYDROCHLORIDE 25 MG/1
25 TABLET, FILM COATED ORAL DAILY
Qty: 7 TABLET | Refills: 0 | Status: SHIPPED | OUTPATIENT
Start: 2022-06-16 | End: 2022-09-01 | Stop reason: ALTCHOICE

## 2022-06-16 NOTE — PROGRESS NOTES
Prenatal Visit    Adolfo Terrazas is a 27 y.o. female [id number] at 26w0d    The patient was seen and evaluated. Reports positive fetal movements. She denies headache, vision changes, RUQ pain, contractions, vaginal bleeding and leakage of fluid. The patient declined the influenza vaccine this year. The problem list reflects the active issues addressed during today's visit    Vitals:    BP: 106/70  Weight: 222 lb 6.4 oz (100.9 kg)  Heart Rate: (!) 102  Fundal Height (cm): 26 cm  Fetal Heart Rate: 161  Movement: Present     Labs: The patient is RH Neg, Rhogam at next appt  ABO/Rh   Date Value Ref Range Status   03/04/2022 A NEGATIVE  Final       1 hour GTT test is ordered    Assessment & Plan:  Adolfo Terrazas is a 27 y.o. female [id number] at 26w0d   - 28 week labs ordered, including (CBC, 1 hr GTT, U/A C&S), the patient is to complete this in the next 2 weeks. - NIPT low risk, male   - A single marker MSAFP was reviewed and found to be normal.    - The patients anatomy ultrasound has been completed and reviewed with patient.    - Next [facility] appointment 7/14/22   - The ACIP recommended pregnant patients be included in phase 1C of vaccine distribution. This decision is supported by [facility] and ACOG. As of February 16, 2021, there have been over 30,000 pregnant patients included in the V-safe post COVID vaccination safety . Most (73%) reports to VAERS among pregnant women involved non-pregnancyspecific adverse events (e.g., local and systemic reactions). Miscarriage was the most frequently reported pregnancy-specific adverse event to VAERS; numbers are within the known background rates based on presumed COVID-19 vaccine doses administered to pregnant women. No unexpected pregnancy or infant outcomes have been observed related to  COVID-19 vaccination during pregnancy. Recommended patient proceed with vaccination.    -Patient struggling with depression at this time.   She denies current suicidal or homicidal ideations. We will restart Zoloft, which has worked well for her in the past.        Patient Active Problem List    Diagnosis Date Noted    Episode of recurrent major depressive disorder ([facility] [address] 06/16/2022     Priority: Medium     Zoloft started 6/16/22       Supervision of other normal pregnancy, antepartum 05/19/2022     Priority: Medium    Rh negative state in antepartum period 03/04/2022     Needs rhogam @ 28 wks  Received early in pregnancy for bleeding      Dysmenorrhea 03/19/2021    Anxiety 03/19/2021    Acute cystitis without hematuria      Return in about 2 weeks (around 6/30/2022) for [facility]   6/16/2022, 1:43 PM

## 2022-06-24 ENCOUNTER — HOSPITAL ENCOUNTER (OUTPATIENT)
Age: 27
Setting detail: SPECIMEN
Discharge: HOME | End: 2022-06-24

## 2022-06-24 DIAGNOSIS — Z3A.13 13 WEEKS GESTATION OF PREGNANCY: ICD-10-CM

## 2022-06-24 DIAGNOSIS — Z67.91 RH NEGATIVE STATE IN ANTEPARTUM PERIOD: ICD-10-CM

## 2022-06-24 DIAGNOSIS — Z3A.26 26 WEEKS GESTATION OF PREGNANCY: ICD-10-CM

## 2022-06-24 DIAGNOSIS — Z87.898 HISTORY OF HEADACHE: ICD-10-CM

## 2022-06-24 DIAGNOSIS — O26.899 RH NEGATIVE STATE IN ANTEPARTUM PERIOD: ICD-10-CM

## 2022-06-24 DIAGNOSIS — R11.0 NAUSEA: ICD-10-CM

## 2022-06-24 LAB
ABSOLUTE EOS #: 0.12 K/UL (ref 0–0.44)
ABSOLUTE IMMATURE GRANULOCYTE: 0.07 K/UL (ref 0–0.3)
ABSOLUTE LYMPH #: 1.6 K/UL (ref 1.1–3.7)
ABSOLUTE MONO #: 0.58 K/UL (ref 0.1–1.2)
BASOPHILS # BLD: 0 % (ref 0–2)
BASOPHILS ABSOLUTE: 0.03 K/UL (ref 0–0.2)
EOSINOPHILS RELATIVE PERCENT: 1 % (ref 1–4)
GLUCOSE ADMINISTRATION: ABNORMAL
GLUCOSE TOLERANCE SCREEN 50G: 170 MG/DL (ref 70–135)
HCT VFR BLD CALC: 36.6 % (ref 36.3–47.1)
HEMOGLOBIN: 12.3 G/DL (ref 11.9–15.1)
IMMATURE GRANULOCYTES: 1 %
LYMPHOCYTES # BLD: 13 % (ref 24–43)
MCH RBC QN AUTO: 29.5 PG (ref 25.2–33.5)
MCHC RBC AUTO-ENTMCNC: 33.6 G/DL (ref 28.4–34.8)
MCV RBC AUTO: 87.8 FL (ref 82.6–102.9)
MONOCYTES # BLD: 5 % (ref 3–12)
NRBC AUTOMATED: 0 PER 100 WBC
PDW BLD-RTO: 15.2 % (ref 11.8–14.4)
PLATELET # BLD: 209 K/UL (ref 138–453)
PMV BLD AUTO: 10.5 FL (ref 8.1–13.5)
RBC # BLD: 4.17 M/UL (ref 3.95–5.11)
RBC # BLD: ABNORMAL 10*6/UL
SEG NEUTROPHILS: 80 % (ref 36–65)
SEGMENTED NEUTROPHILS ABSOLUTE COUNT: 9.7 K/UL (ref 1.5–8.1)
WBC # BLD: 12.1 K/UL (ref 3.5–11.3)

## 2022-06-25 LAB
CULTURE: NORMAL
SPECIMEN DESCRIPTION: NORMAL

## 2022-06-27 DIAGNOSIS — R73.09 ELEVATED GLUCOSE TOLERANCE TEST: Primary | ICD-10-CM

## 2022-06-30 ENCOUNTER — ROUTINE PRENATAL (OUTPATIENT)
Dept: OBGYN CLINIC | Age: 27
End: 2022-06-30
Payer: COMMERCIAL

## 2022-06-30 VITALS — WEIGHT: 221 LBS | SYSTOLIC BLOOD PRESSURE: 110 MMHG | DIASTOLIC BLOOD PRESSURE: 64 MMHG | BODY MASS INDEX: 36.78 KG/M2

## 2022-06-30 DIAGNOSIS — Z67.91 RH NEGATIVE STATE IN ANTEPARTUM PERIOD: ICD-10-CM

## 2022-06-30 DIAGNOSIS — Z3A.28 28 WEEKS GESTATION OF PREGNANCY: Primary | ICD-10-CM

## 2022-06-30 DIAGNOSIS — O26.899 RH NEGATIVE STATE IN ANTEPARTUM PERIOD: ICD-10-CM

## 2022-06-30 PROCEDURE — 90715 TDAP VACCINE 7 YRS/> IM: CPT

## 2022-06-30 PROCEDURE — 99212 OFFICE O/P EST SF 10 MIN: CPT

## 2022-06-30 RX ORDER — BREAST PUMP
1 EACH MISCELLANEOUS
Qty: 1 EACH | Refills: 0 | Status: SHIPPED | OUTPATIENT
Start: 2022-06-30 | End: 2023-06-30

## 2022-06-30 NOTE — PROGRESS NOTES
[address] [facility] Latrice Pal[facility]  [address] [place] [address] [address] [facility]  Dept: [phone]    PCP: No primary care provider on file. Chief Complaint   Patient presents with    Routine Prenatal Visit     OB 28w0d    Teofilo Mensah  is a [id number] who presents to the clinic today at 28w0d for routine prenatal monitoring. Reports good fetal movement, denies VB, ctx or LOF. Work going ok. Was doing PT for Back pain, wasn't finding it beneficial and stopped going. Back pain persists. Using massager, heating pad, belly band. MFM report reviewed, follow up appt scheduled for July 14. Started zoloft two weeks ago, taking 50 mg daily. Noticed she has more nausea in the past week. Denies SI/HI. Mood improved    Rhogam given today. 28 week labs - has not completed 3 hr yet. Plans to complete  TDAP today. PP contraception: wants post placental iud  Infant feeding needs: formula    Patient Active Problem List    Diagnosis Date Noted    Supervision of other normal pregnancy, antepartum 05/19/2022     Priority: Medium    Episode of recurrent major depressive disorder ([facility] [address] 06/16/2022     Overview Note:     Zoloft started 6/16/22       Rh negative state in antepartum period 03/04/2022     Overview Note:     Needs rhogam @ 28 wks  Received early in pregnancy for bleeding      Dysmenorrhea 03/19/2021    Anxiety 03/19/2021    Acute cystitis without hematuria       Review of Systems   Constitutional: Negative for chills, fatigue and fever. Genitourinary: Negative for decreased urine volume, difficulty urinating, dyspareunia, dysuria, frequency, genital sores, hematuria, menstrual problem, pelvic pain, urgency, vaginal bleeding, vaginal discharge and vaginal pain. Psychiatric/Behavioral: Positive for dysphoric mood. All other systems reviewed and are negative.      Denies unusual headaches, vision changes, RUQ pain    Vitals:    06/30/22 1303 BP: 110/64   Weight: 221 lb (100.2 kg)      FHR:  FH:    Physical Exam  Constitutional:       General: She is awake. She is not in acute distress. Appearance: Normal appearance. She is well-developed and well-groomed. She is not ill-appearing, toxic-appearing or diaphoretic. HENT:      Head: Normocephalic and atraumatic. Nose: Nose normal.   Eyes:      Extraocular Movements: Extraocular movements intact. Pulmonary:      Effort: Pulmonary effort is normal.   Musculoskeletal:         General: Normal range of motion. Cervical back: Normal range of motion. Neurological:      General: No focal deficit present. Mental Status: She is alert and oriented to person, place, and time. Mental status is at baseline. Psychiatric:         Attention and Perception: Attention and perception normal.         Mood and Affect: Mood normal.         Speech: Speech normal.         Behavior: Behavior normal. Behavior is cooperative. Thought Content: Thought content normal.         Cognition and Memory: Cognition and memory normal.         Judgment: Judgment normal.   Vitals reviewed. Assessment and Plan:   Glenis was seen today for routine prenatal visit. Diagnoses and all orders for this visit:    28 weeks gestation of pregnancy  -     Tdap, BOOSTRIX, (age 8 yrs+), IM    Rh negative state in antepartum period  -     rho(D) immune globulin (HYPERRHO S/D) injection 300 mcg    Other orders  -     Misc. Devices (BREAST PUMP) MISC; 1 each by Does not apply route every 3 hours    Educated on the importance of vaccines (TDAP, Flu, Covid) in pregnancy. The [employer] of Obstetricians and Gynecologists (ACOG) and the Society for [place] [address] (SM), the two leading organizations representing specialists in obstetric care, recommend that all pregnant individuals be vaccinated against COVID-19.  The organizations recommendations in support of vaccination during pregnancy reflect evidence demonstrating the safe use of the COVID-19 vaccines during pregnancy from tens of thousands of reporting individuals. Data have shown that COVID-19 infection puts pregnant people at increased risk of severe complications and even death. Educated on  labor, signs and symptoms of preeclampsia. Notify office of any decreased movement, vaginal bleeding, loss of fluid or contractions. EDC: Estimated Date of Delivery: 22    RTO 2 weeks or sooner PRN.     The patient was seen face to face and examined today by LINDA Tong - CNP

## 2022-06-30 NOTE — PROGRESS NOTES
After obtaining consent, and per orders of Dara Madrigal CNP, injection of Tdap 0.5mL given in Left deltoid IM by Norman Viveros. Patient instructed to remain in clinic for 20 minutes afterwards, and to report any adverse reaction to me immediately. [facility] [id number]  LOT [id number]  EXP 4/7/24    After obtaining consent, and per orders of Dara Madrigal CNP, injection of RhoGam 1500 IU given in Right deltoid IM by Norman Viveros. Patient instructed to remain in clinic for 20 minutes afterwards, and to report any adverse reaction to me immediately.     [facility] [id number]  LOT [id number]  EXP 4/7/22

## 2022-06-30 NOTE — PATIENT INSTRUCTIONS
Patient Education        Learning About Rh Immunoglobulin Shots  Introduction     An Rh immunoglobulin shot is given to pregnant women who have Rh-negative blood. You may have Rh-negative blood, and your baby may have Rh-positive blood. If the two types of blood mix, your body will make antibodies. This is called Rh sensitization. Most of the time, this is not a problem the first time you'repregnant. But it could cause problems in future pregnancies. This shot keeps your body from making the antibodies. You get the shot around 28 weeks of pregnancy. After the birth, your baby's blood is tested. If the blood is Rh positive, you will get another shot. You may also get the shot if you have vaginal bleeding while you are pregnant or if you have a miscarriage. These shots protect future pregnancies. Women with Rh negative blood will need this shot each time they get pregnant. Example   Rh immunoglobulin (HypRho-D, MICRhoGAM, and RhoGAM)  Possible side effects  Rare side effects may include:   Some mild pain where you got the shot.  A slight fever.  An allergic reaction. You may have other side effects not listed here. Check the information thatcomes with your medicine. What to know about taking this medicine   You may need more than one shot. You may need the shot again:  ? After amniocentesis, fetal blood sampling, or chorionic villus sampling tests. ? If you have bleeding in your second or third trimester. ? After turning of a breech baby. ? After an injury to the belly while you are pregnant. ? After a miscarriage or an . ? Before or right after treatment for an ectopic or a partial molar pregnancy.  Tell your doctor if you have any allergies or have had a bad response to medicines in the past.   If you get this shot within 3 months of getting a live-virus vaccine, the vaccine may not work. Your doctor will tell you if you need more vaccine.    Check with your doctor or pharmacist before you use any other medicines. This includes over-the-counter medicines. Make sure your doctor knows all of the medicines, vitamins, herbs, and supplements you take. Taking some medicines at the same time can cause problems. Where can you learn more? Go to https://changeleb.BlogCN. org and sign in to your Handipoints account. Enter R242 in the [place] box to learn more about \"Learning About Rh Immunoglobulin Shots. \"     If you do not have an account, please click on the \"Sign Up Now\" link. Current as of: February 23, 2022               Content Version: 13.3  © 9490-6912 Healthwise, Incorporated. Care instructions adapted under license by [facility] ([facility]). If you have questions about a medical condition or this instruction, always ask your healthcare professional. [address] any warranty or liability for your use of this information.

## 2022-07-01 ENCOUNTER — HOSPITAL ENCOUNTER (OUTPATIENT)
Age: 27
Setting detail: SPECIMEN
Discharge: HOME | End: 2022-07-01

## 2022-07-01 DIAGNOSIS — R73.09 ELEVATED GLUCOSE TOLERANCE TEST: ICD-10-CM

## 2022-07-01 LAB
AMOUNT GLUCOSE GIVEN: ABNORMAL G
GLUCOSE FASTING: 85 MG/DL (ref 65–99)
GLUCOSE TOLERANCE TEST 1 HOUR: 189 MG/DL (ref 65–184)
GLUCOSE TOLERANCE TEST 2 HOUR: 183 MG/DL (ref 65–139)
GLUCOSE TOLERANCE TEST 3 HOUR: 168 MG/DL (ref 65–130)

## 2022-07-06 ENCOUNTER — TELEPHONE (OUTPATIENT)
Dept: OBGYN CLINIC | Age: 27
End: 2022-07-06

## 2022-07-07 PROBLEM — Z23 NEED FOR TDAP VACCINATION: Status: ACTIVE | Noted: 2022-07-07

## 2022-07-07 NOTE — PROGRESS NOTES
Glenis is a  @ 29w1d who presents for SALOME visit. She denies LOF, VB or Ctxs.  + FM. She denies any complaints. She denies any fevers/chills, SOB, cough, sore throat, loss of taste/smell or sick contacts. Pt denies any HA, vision changes or RUQ pain. O:  Vitals:    22 0901   BP: 113/74   Pulse: 92     Gen: NAD  Abd: soft, nontender, gravid  Ext:  no edema      BP: 113/74  Weight: 221 lb (100.2 kg)  Heart Rate: 92  Fundal Height (cm): 29 cm  Fetal Heart Rate: 145  Movement: Present    A/P:  Patient Active Problem List    Diagnosis Date Noted    Diet controlled gestational diabetes mellitus (GDM) in third trimester 2022     Priority: Medium    Need for Tdap vaccination 2022     Priority: Medium     Given       Supervision of other normal pregnancy, antepartum 2022     Priority: Medium    Episode of recurrent major depressive disorder ([facility] [address] 2022     Zoloft started 22       Rh negative state in antepartum period 2022     Rhogam       Dysmenorrhea 2021    Anxiety 2021    Acute cystitis without hematuria      Discussed updated COVID precautions and policies. Reviewed updated visitor policy. Encouraged social distancing and appropriate hand washing/hygiene practices. Reviewed symptoms suspicious for COVID infection. Discussed that ACOG, SMFM, and the CDC recommend to not withold immunization in pregnant and breastfeeding women who meet criteria for receipt of the vaccine based on the ACIP recommended priority groups. All questions answered. Patient vocalized understanding.     Discussed s/sx that should prompt call to the office  Discussed kick counts  RTC in 2 wks    Nicole Meyer MD

## 2022-07-08 ENCOUNTER — ROUTINE PRENATAL (OUTPATIENT)
Dept: OBGYN CLINIC | Age: 27
End: 2022-07-08
Payer: COMMERCIAL

## 2022-07-08 ENCOUNTER — TELEPHONE (OUTPATIENT)
Dept: OBGYN CLINIC | Age: 27
End: 2022-07-08

## 2022-07-08 VITALS
DIASTOLIC BLOOD PRESSURE: 74 MMHG | WEIGHT: 221 LBS | SYSTOLIC BLOOD PRESSURE: 113 MMHG | BODY MASS INDEX: 36.78 KG/M2 | HEART RATE: 92 BPM

## 2022-07-08 DIAGNOSIS — O24.410 DIET CONTROLLED GESTATIONAL DIABETES MELLITUS (GDM) IN THIRD TRIMESTER: ICD-10-CM

## 2022-07-08 DIAGNOSIS — O09.93 HIGH-RISK PREGNANCY IN THIRD TRIMESTER: ICD-10-CM

## 2022-07-08 DIAGNOSIS — Z3A.29 29 WEEKS GESTATION OF PREGNANCY: Primary | ICD-10-CM

## 2022-07-08 DIAGNOSIS — O24.414 INSULIN CONTROLLED GESTATIONAL DIABETES MELLITUS (GDM) IN THIRD TRIMESTER: ICD-10-CM

## 2022-07-08 PROCEDURE — 99212 OFFICE O/P EST SF 10 MIN: CPT | Performed by: OBSTETRICS & GYNECOLOGY

## 2022-07-08 NOTE — TELEPHONE ENCOUNTER
[facility] for pt that she had failed her 3 hr gtt, referrals to be placed to [address] [facility] and  diabetic ED. BS meter and supplies will be sent to her pharmacy.      (pt does have current US visit scheduled on 7/14/22 at [address] [facility])

## 2022-07-08 NOTE — TELEPHONE ENCOUNTER
----- Message from Huy Fierro sent at 7/8/2022 10:47 AM EDT -----  Will you please send in supplies for patient.  ----- Message -----  From: Kami Flaherty DO  Sent: 7/5/2022   8:15 AM EDT  To: Moo Wilcox MA, Ml Avina MA    Abnormal 3 hr glucose test. Will need blood glucose testing supplies and referral to Diabetic Ed and MFM for newly diagnosed gestational diabetes.

## 2022-07-11 NOTE — TELEPHONE ENCOUNTER
[facility]

## 2022-07-14 ENCOUNTER — ROUTINE PRENATAL (OUTPATIENT)
Dept: PERINATAL CARE | Age: 27
End: 2022-07-14
Payer: COMMERCIAL

## 2022-07-14 VITALS
HEIGHT: 66 IN | WEIGHT: 223 LBS | BODY MASS INDEX: 35.84 KG/M2 | RESPIRATION RATE: 16 BRPM | HEART RATE: 100 BPM | SYSTOLIC BLOOD PRESSURE: 106 MMHG | DIASTOLIC BLOOD PRESSURE: 71 MMHG | TEMPERATURE: 97.2 F

## 2022-07-14 DIAGNOSIS — O99.213 OBESITY AFFECTING PREGNANCY IN THIRD TRIMESTER: ICD-10-CM

## 2022-07-14 DIAGNOSIS — O24.419 GESTATIONAL DIABETES MELLITUS (GDM), ANTEPARTUM, GESTATIONAL DIABETES METHOD OF CONTROL UNSPECIFIED: Primary | ICD-10-CM

## 2022-07-14 DIAGNOSIS — Z3A.30 30 WEEKS GESTATION OF PREGNANCY: ICD-10-CM

## 2022-07-14 DIAGNOSIS — Z36.4 ANTENATAL SCREENING FOR FETAL GROWTH RETARDATION USING ULTRASONICS: ICD-10-CM

## 2022-07-14 DIAGNOSIS — Z13.89 ENCOUNTER FOR ROUTINE SCREENING FOR MALFORMATION USING ULTRASONICS: ICD-10-CM

## 2022-07-14 LAB
ABDOMINAL CIRCUMFERENCE: NORMAL
BIPARIETAL DIAMETER: NORMAL
ESTIMATED FETAL WEIGHT: NORMAL
FEMORAL DIAMETER: NORMAL
HC/AC: NORMAL
HEAD CIRCUMFERENCE: NORMAL

## 2022-07-14 PROCEDURE — 76805 OB US >/= 14 WKS SNGL FETUS: CPT | Performed by: OBSTETRICS & GYNECOLOGY

## 2022-07-14 PROCEDURE — 76819 FETAL BIOPHYS PROFIL W/O NST: CPT | Performed by: OBSTETRICS & GYNECOLOGY

## 2022-07-14 PROCEDURE — 99999 PR OFFICE/OUTPT VISIT,PROCEDURE ONLY: CPT | Performed by: OBSTETRICS & GYNECOLOGY

## 2022-07-21 ENCOUNTER — HOSPITAL ENCOUNTER (OUTPATIENT)
Dept: DIABETES SERVICES | Age: 27
Setting detail: THERAPIES SERIES
Discharge: HOME | End: 2022-07-21
Payer: COMMERCIAL

## 2022-07-21 ENCOUNTER — ROUTINE PRENATAL (OUTPATIENT)
Dept: OBGYN CLINIC | Age: 27
End: 2022-07-21
Payer: COMMERCIAL

## 2022-07-21 VITALS
HEIGHT: 66 IN | BODY MASS INDEX: 36.32 KG/M2 | WEIGHT: 226 LBS | SYSTOLIC BLOOD PRESSURE: 117 MMHG | DIASTOLIC BLOOD PRESSURE: 81 MMHG

## 2022-07-21 DIAGNOSIS — Z3A.31 31 WEEKS GESTATION OF PREGNANCY: Primary | ICD-10-CM

## 2022-07-21 DIAGNOSIS — O24.410 DIET CONTROLLED GESTATIONAL DIABETES MELLITUS (GDM) IN THIRD TRIMESTER: ICD-10-CM

## 2022-07-21 DIAGNOSIS — O09.93 SUPERVISION OF HIGH-RISK PREGNANCY, THIRD TRIMESTER: ICD-10-CM

## 2022-07-21 PROCEDURE — G0108 DIAB MANAGE TRN  PER INDIV: HCPCS

## 2022-07-21 PROCEDURE — 99213 OFFICE O/P EST LOW 20 MIN: CPT | Performed by: STUDENT IN AN ORGANIZED HEALTH CARE EDUCATION/TRAINING PROGRAM

## 2022-07-21 RX ORDER — LANOLIN ALCOHOL/MO/W.PET/CERES
CREAM (GRAM) TOPICAL
COMMUNITY
Start: 2022-07-08

## 2022-07-21 NOTE — CONSULTS
Diabetes Education Progress Note    Miracle Jessica Larger here for education about Diabetes and Pregnancy. Teaching provided at this session included:   _X__ Definition of gestational diabetes    _X_ Risk factors   _X__ Effects on pregnancy       _ X_ Management   _X__ Self-monitoring of blood sugar (FBS and 2 hrs postprandial)   _X__ Blood sugar targets FBS 65-90 and <120 2 hrs postprandial)   _X__ Insulin   _x__ Pen   __x_ Vial --  Novolog and NPH role and action times   _x___Hyperglycemia  _x__ Hypoglycemia and treatment             Meal planning:   _X_  Avoid fruit juice and sugary beverages. _X_  Aim to eat small frequent meals and snacks. (ie., 3 + 3)                _X__  Eat balanced meals according to divided dinner plate sample  4/17/53 in am and all day - nausea - - cough and then sometimes gag and vomit, BK skipped often, -  suggested she try one CHO choice like whole grain toast and with scrambled egg, she does like a glass of milk with meals also ( BK and DN)  Ayse - left over from dinner ( usually at work) - DN  at about 7 - 8pm - at [employer] and [employer] and mustard - fills divided plate container with chip 1/4 and one fruit  Finding zero sugar beverages- suggested add in low CHO veggies in place of chips            Planned follow-up:    _x_  Another appointment has been scheduled for RD visit on 7/28/22               __  Patient defers scheduling another appointment at this time               __   Follow up planned for prn. in am and all day              _X_ Patient is to report blood glucose test results to physician as directed by  prescribing physician. Resource materials provided today include: RN class -Resource materials sent out : care booklet - \" Gestational Diabetes Mellitus ( GDM) toolkit form [place] gestational diabetes postpartum care learning collaborative 2018. \"Simple Guidelines for meal planning with gestational diabetes.   SMBG sheets to fax back

## 2022-07-21 NOTE — PROGRESS NOTES
Prenatal Visit    Kassidy Lim is a 27 y.o. female [id number] at 31w0d    The patient was seen and evaluated. Reports positive fetal movements. She denies headache, vision changes, RUQ pain, contractions, vaginal bleeding and leakage of fluid. The patient was instructed on fetal kick counts and was given a kick sheet to complete every 8 hours. She was instructed that the baby should move at a minimum of ten times within one hour after a meal. The patient was instructed to lay down on her left side twenty minutes after eating and count movements for up to one hour with a target value of ten movements. She was instructed to notify the office if she did not make that target after two attempts or if after any attempt there was less than four movements. The patient reports that the targets have been made. The patient already received the T-Dap Vaccine (27-36 weeks) this pregnancy. The patient declined the influenza vaccine this year. The problem list reflects the active issues addressed during today's visit    Vitals:    BP: 117/81  Weight: 226 lb (102.5 kg)  Fundal Height (cm): 31 cm  Fetal HR: 145  Movement: Present     28 Week Labs: The patient is RH Neg, Rhogam given  ABO/Rh   Date Value Ref Range Status   2022 A NEGATIVE  Final       1 hour Glucose Tolerance Test: 170    Glucose Fastin  1 hour Glucose Tolerance Test: 189  2 hour Glucose Tolerance Test: 183  3 hour Glucose Tolerance Test: 168    28 week CBC:   Lab Results   Component Value Date    WBC 12.1 (H) 2022    HGB 12.3 2022    HCT 36.6 2022    MCV 87.8 2022     2022     UA w/ Ur C&S: neg     Assessment & Plan:  Kassidy Lim is a 27 y.o. female [id number] at 31w0d   - 28 week labs completed   - discussed recommendations for TDAP immunization, patient already received TDAP. - Next [facility] appointment 22   - The ACIP recommended pregnant patients be included in phase 1C of vaccine distribution. This decision is supported by [facility] and ACOG. As of February 16, 2021, there have been over 30,000 pregnant patients included in the V-safe post COVID vaccination safety . Most (73%) reports to VAERS among pregnant women involved non-pregnancyspecific adverse events (e.g., local and systemic reactions). Miscarriage was the most frequently reported pregnancy-specific adverse event to VAERS; numbers are within the known background rates based on presumed COVID-19 vaccine doses administered to pregnant women. No unexpected pregnancy or infant outcomes have been observed related to  COVID-19 vaccination during pregnancy. Recommended patient proceed with vaccination. Patient Active Problem List    Diagnosis Date Noted    GDMA1 07/08/2022     Priority: Medium    Episode of recurrent major depressive disorder ([facility] [address] 06/16/2022     Priority: Medium     Zoloft started 6/16/22       Need for Tdap vaccination 07/07/2022     Given 6/30      Rh negative state in antepartum period 03/04/2022     Rhogam 6/30      Dysmenorrhea 03/19/2021    Anxiety 03/19/2021    Acute cystitis without hematuria      Return in about 2 weeks (around 8/4/2022) for ROB. Bunnie Harada, DO [facility]   7/21/2022, 1:21 PM

## 2022-07-28 ENCOUNTER — HOSPITAL ENCOUNTER (OUTPATIENT)
Dept: DIABETES SERVICES | Age: 27
Setting detail: THERAPIES SERIES
Discharge: HOME | End: 2022-07-28
Payer: COMMERCIAL

## 2022-07-28 ENCOUNTER — ROUTINE PRENATAL (OUTPATIENT)
Dept: PERINATAL CARE | Age: 27
End: 2022-07-28
Payer: COMMERCIAL

## 2022-07-28 VITALS
RESPIRATION RATE: 16 BRPM | TEMPERATURE: 97.2 F | DIASTOLIC BLOOD PRESSURE: 73 MMHG | HEART RATE: 102 BPM | HEIGHT: 66 IN | WEIGHT: 223 LBS | SYSTOLIC BLOOD PRESSURE: 102 MMHG | BODY MASS INDEX: 35.84 KG/M2

## 2022-07-28 DIAGNOSIS — Z3A.32 32 WEEKS GESTATION OF PREGNANCY: ICD-10-CM

## 2022-07-28 DIAGNOSIS — O24.419 GESTATIONAL DIABETES MELLITUS (GDM), ANTEPARTUM, GESTATIONAL DIABETES METHOD OF CONTROL UNSPECIFIED: Primary | ICD-10-CM

## 2022-07-28 PROCEDURE — 99243 OFF/OP CNSLTJ NEW/EST LOW 30: CPT | Performed by: OBSTETRICS & GYNECOLOGY

## 2022-07-28 PROCEDURE — G8417 CALC BMI ABV UP PARAM F/U: HCPCS | Performed by: OBSTETRICS & GYNECOLOGY

## 2022-07-28 PROCEDURE — G0108 DIAB MANAGE TRN  PER INDIV: HCPCS

## 2022-07-28 PROCEDURE — G8427 DOCREV CUR MEDS BY ELIG CLIN: HCPCS | Performed by: OBSTETRICS & GYNECOLOGY

## 2022-07-29 ENCOUNTER — TELEPHONE (OUTPATIENT)
Dept: OBGYN CLINIC | Age: 27
End: 2022-07-29

## 2022-07-29 NOTE — TELEPHONE ENCOUNTER
Can you create letter and I can sign?     Thanks,    Mary Jane Wheeler, [address] [facility]   7/29/2022, 12:31 PM

## 2022-07-30 NOTE — PROGRESS NOTES
22 MNT for GDM   Diabetes Education Progress Note    Glenis Ham Goods here for education about Diabetes and Pregnancy. Teaching provided at this session included: 22 BERRY Reviewed below. Pt states not checking bs bc of not eating regular and forgets. Encouraged pt to at least start with fasting bc most common with being outside target and if not eating a full meal ok to check after snack and gave target for 1 hour pp.   _X__ Definition of gestational diabetes    _X_ Risk factors   _X__ Effects on pregnancy       _ X_ Management   _X__ Self-monitoring of blood sugar (FBS and 2 hrs postprandial)   _X__ Blood sugar targets FBS 65-90 and <120 2 hrs postprandial)   _X__ Insulin   _x__ Pen   __x_ [facility] --  Novolog and NPH role and action times   _x___Hyperglycemia  _x__ Hypoglycemia and treatment             Meal plannin22 BERRY reviewed below and gave suggestions for foods to try when experiencing morning sickness/nausea. Pt has been gaining weight despite not feeling well. Prepregnancy weight 195#, current weight 223# (28# up to this point). [facility] 2022. _X_  Avoid fruit juice and sugary beverages.               _X_  Aim to eat small frequent meals and snacks. (ie., 3 + 3)                _X__  Eat balanced meals according to divided dinner plate sample   in am and all day - nausea - - cough and then sometimes gag and vomit, BK skipped often, -  suggested she try one CHO choice like whole grain toast and with scrambled egg, she does like a glass of milk with meals also ( BK and DN)  Ayse - left over from dinner ( usually at work) - DN  at about 7 - 8pm - at hamb[employer] cheese and [employer] and mustard - fills divided plate container with chip 1/4 and one fruit  Finding zero sugar beverages- suggested add in low CHO veggies in place of chips            Planned follow-up:    _x_  Another appointment has been scheduled for RD visit on 22. 22 BERRY Encouraged pt to call should questions arise now or after pregnancy. __  Patient defers scheduling another appointment at this time               __   Follow up planned for prn. in am and all day              _X_ Patient is to report blood glucose test results to physician as directed by  prescribing physician. Resource materials provided today include: RN class -Resource materials sent out : care booklet - \" Gestational Diabetes Mellitus ( GDM) toolkit form [place] gestational diabetes postpartum care learning collaborative 2018. \"Simple Guidelines for meal planning with gestational diabetes. SMBG sheets to fax back to [facility] weekly. BD  healthy injection site selection and rotation with 6 mm insulin syringe and 4 mm pen needle. Gestational diabetes handout from [facility] 2016, Did you have gestational diabetes when you were pregnant? Handout from [facility]  April 2014    [place] Gestational Diabetes Postpartum Care Learning Collaborative: GDM meal Toolkit, Blood Glucose monitoring sheets, Articles, \"Benefits of Breast feeding\" and NDEP, \"Preventing Type 2 Diabetes,\" and Insulin Instruction Sheets. Set diabetes self management goals of SMBG- check fasting and 2 hours PP, eat 3 meals and 3 snacks/day, avoid sugary beverages. Patient uses home BG meter -  stated fasting in 80- 130 range and post meals 100 - 150  Patient reviewed with writer pen method for insulin self administration if ordered in the future.     Josh Escobedo RD, LD [facility]

## 2022-08-04 ENCOUNTER — ROUTINE PRENATAL (OUTPATIENT)
Dept: PERINATAL CARE | Age: 27
End: 2022-08-04
Payer: COMMERCIAL

## 2022-08-04 VITALS
SYSTOLIC BLOOD PRESSURE: 130 MMHG | WEIGHT: 227 LBS | TEMPERATURE: 97.2 F | RESPIRATION RATE: 16 BRPM | HEIGHT: 66 IN | BODY MASS INDEX: 36.48 KG/M2 | DIASTOLIC BLOOD PRESSURE: 77 MMHG | HEART RATE: 102 BPM

## 2022-08-04 DIAGNOSIS — O99.213 OBESITY AFFECTING PREGNANCY IN THIRD TRIMESTER: ICD-10-CM

## 2022-08-04 DIAGNOSIS — O24.410 DIET CONTROLLED GESTATIONAL DIABETES MELLITUS (GDM), ANTEPARTUM: Primary | ICD-10-CM

## 2022-08-04 DIAGNOSIS — Z36.4 ANTENATAL SCREENING FOR FETAL GROWTH RETARDATION USING ULTRASONICS: ICD-10-CM

## 2022-08-04 DIAGNOSIS — Z3A.33 33 WEEKS GESTATION OF PREGNANCY: ICD-10-CM

## 2022-08-04 PROCEDURE — 76819 FETAL BIOPHYS PROFIL W/O NST: CPT | Performed by: OBSTETRICS & GYNECOLOGY

## 2022-08-04 PROCEDURE — 76816 OB US FOLLOW-UP PER FETUS: CPT | Performed by: OBSTETRICS & GYNECOLOGY

## 2022-08-04 PROCEDURE — 99999 PR OFFICE/OUTPT VISIT,PROCEDURE ONLY: CPT | Performed by: OBSTETRICS & GYNECOLOGY

## 2022-08-04 NOTE — PROGRESS NOTES
Blood sugars reviewed (adequate glycemic control). Please continue to send blood glucose monitoring information to [facility] office so that insulin and/or dietary changes can be made if necessary weekly. Diabetic education/nutrition counseling completed. Continue recommended ADA diet therapy for diabetes. Compliance with regular prenatal care and blood sugar monitoring strongly encouraged.       Strongly advised patient to be compliant with blood sugar monitoring as previously advised to minimize the potential of adverse  outcomes (e.g. fetal demise/stillbirth, polyhydramnios/oligohydramnios, fetal growth abnormalities, pregnancy loss, hypertensive disorders of pregnancy, indicated  delivery, etc.), potential maternal morbidities, etc.

## 2022-08-05 ENCOUNTER — ROUTINE PRENATAL (OUTPATIENT)
Dept: OBGYN CLINIC | Age: 27
End: 2022-08-05
Payer: COMMERCIAL

## 2022-08-05 VITALS
DIASTOLIC BLOOD PRESSURE: 82 MMHG | BODY MASS INDEX: 36.15 KG/M2 | WEIGHT: 224 LBS | HEART RATE: 96 BPM | SYSTOLIC BLOOD PRESSURE: 118 MMHG

## 2022-08-05 DIAGNOSIS — O09.33 PRENATAL CARE INSUFFICIENT, THIRD TRIMESTER: Primary | ICD-10-CM

## 2022-08-05 PROCEDURE — 99212 OFFICE O/P EST SF 10 MIN: CPT | Performed by: OBSTETRICS & GYNECOLOGY

## 2022-08-05 NOTE — PROGRESS NOTES
Glenis is a  @ 33w1d who presents for [facility] visit. She denies LOF, VB or Ctxs.  + FM. She says she had some [place] on Wednesday, but then they resolved. She denies complaints. She denies any fevers/chills, SOB, cough, sore throat, loss of taste/smell or sick contacts. Pt denies any HA, vision changes or RUQ pain. O:  Vitals:    22 1249   BP: 118/82   Pulse: 96     Gen: NAD  Abd: soft, nontender, gravid  Ext:  no edema      BP: 118/82  Weight: 224 lb (101.6 kg)  Heart Rate: 96  Patient Position: Sitting  Fundal Height (cm): 34 cm  Fetal HR: 145  Movement: Present    A/P:  Patient Active Problem List    Diagnosis Date Noted    GDMA1 2022     Priority: Medium    Need for Tdap vaccination 2022     Priority: Medium     Given       Episode of recurrent major depressive disorder ([facility] [address] 2022     Zoloft started 22       Rh negative state in antepartum period 2022     Rhogam       Dysmenorrhea 2021    Anxiety 2021    Acute cystitis without hematuria      Discussed updated COVID precautions and policies. Reviewed updated visitor policy. Encouraged social distancing and appropriate hand washing/hygiene practices. Reviewed symptoms suspicious for COVID infection. Discussed that ACOG, SMFM, and the CDC recommend to not withold immunization in pregnant and breastfeeding women who meet criteria for receipt of the vaccine based on the ACIP recommended priority groups. All questions answered. Patient vocalized understanding.     Discussed s/sx that should prompt call to the office  Discussed kick counts  RTC in 2 wks    Rajinder lA MD

## 2022-08-08 ENCOUNTER — PATIENT MESSAGE (OUTPATIENT)
Dept: OBGYN CLINIC | Age: 27
End: 2022-08-08

## 2022-08-08 ENCOUNTER — HOSPITAL ENCOUNTER (OUTPATIENT)
Age: 27
Discharge: HOME | End: 2022-08-08
Attending: OBSTETRICS & GYNECOLOGY | Admitting: OBSTETRICS & GYNECOLOGY
Payer: COMMERCIAL

## 2022-08-08 VITALS
RESPIRATION RATE: 18 BRPM | OXYGEN SATURATION: 99 % | HEART RATE: 88 BPM | SYSTOLIC BLOOD PRESSURE: 122 MMHG | DIASTOLIC BLOOD PRESSURE: 76 MMHG | TEMPERATURE: 98.6 F

## 2022-08-08 PROBLEM — O99.210 OBESITY AFFECTING PREGNANCY: Status: ACTIVE | Noted: 2022-08-08

## 2022-08-08 PROBLEM — Z3A.33 33 WEEKS GESTATION OF PREGNANCY: Status: ACTIVE | Noted: 2022-08-08

## 2022-08-08 LAB
BACTERIA: ABNORMAL
BILIRUBIN URINE: NEGATIVE
CANDIDA SPECIES, DNA PROBE: NEGATIVE
CASTS UA: ABNORMAL /LPF (ref 0–2)
CASTS UA: ABNORMAL /LPF (ref 0–2)
COLOR: YELLOW
EPITHELIAL CELLS UA: ABNORMAL /HPF (ref 0–5)
GARDNERELLA VAGINALIS, DNA PROBE: NEGATIVE
GLUCOSE URINE: NEGATIVE
KETONES, URINE: ABNORMAL
LEUKOCYTE ESTERASE, URINE: ABNORMAL
NITRITE, URINE: NEGATIVE
PH UA: 6.5 (ref 5–8)
PROTEIN UA: NEGATIVE
RBC UA: ABNORMAL /HPF (ref 0–2)
SOURCE: NORMAL
SPECIFIC GRAVITY UA: 1.02 (ref 1–1.03)
TRICHOMONAS VAGINALIS DNA: NEGATIVE
TURBIDITY: CLEAR
URINE HGB: NEGATIVE
UROBILINOGEN, URINE: NORMAL
WBC UA: ABNORMAL /HPF (ref 0–5)

## 2022-08-08 PROCEDURE — 81001 URINALYSIS AUTO W/SCOPE: CPT

## 2022-08-08 PROCEDURE — 87510 GARDNER VAG DNA DIR PROBE: CPT

## 2022-08-08 PROCEDURE — 99213 OFFICE O/P EST LOW 20 MIN: CPT

## 2022-08-08 PROCEDURE — 87480 CANDIDA DNA DIR PROBE: CPT

## 2022-08-08 PROCEDURE — 87491 CHLMYD TRACH DNA AMP PROBE: CPT

## 2022-08-08 PROCEDURE — 87660 TRICHOMONAS VAGIN DIR PROBE: CPT

## 2022-08-08 PROCEDURE — 6370000000 HC RX 637 (ALT 250 FOR IP)

## 2022-08-08 PROCEDURE — 87591 N.GONORRHOEAE DNA AMP PROB: CPT

## 2022-08-08 PROCEDURE — 87086 URINE CULTURE/COLONY COUNT: CPT

## 2022-08-08 RX ORDER — ONDANSETRON 4 MG/1
4 TABLET, ORALLY DISINTEGRATING ORAL EVERY 8 HOURS PRN
Status: DISCONTINUED | OUTPATIENT
Start: 2022-08-08 | End: 2022-08-08 | Stop reason: HOSPADM

## 2022-08-08 RX ORDER — ONDANSETRON 2 MG/ML
4 INJECTION INTRAMUSCULAR; INTRAVENOUS EVERY 6 HOURS PRN
Status: DISCONTINUED | OUTPATIENT
Start: 2022-08-08 | End: 2022-08-08 | Stop reason: HOSPADM

## 2022-08-08 RX ORDER — ACETAMINOPHEN 500 MG
1000 TABLET ORAL EVERY 6 HOURS PRN
Status: DISCONTINUED | OUTPATIENT
Start: 2022-08-08 | End: 2022-08-08 | Stop reason: HOSPADM

## 2022-08-08 RX ADMIN — ACETAMINOPHEN 1000 MG: 500 TABLET ORAL at 12:40

## 2022-08-08 ASSESSMENT — PAIN SCALES - GENERAL: PAINLEVEL_OUTOF10: 4

## 2022-08-08 ASSESSMENT — PAIN DESCRIPTION - DESCRIPTORS: DESCRIPTORS: CRAMPING;DISCOMFORT

## 2022-08-08 ASSESSMENT — PAIN DESCRIPTION - LOCATION: LOCATION: ABDOMEN

## 2022-08-08 NOTE — LETTER
Netta Sharp was seen and treated on our Labor and Delivery unit on 8/8/2022. She may return to work on 8/9/2022. If you have any questions or concerns, please don't hesitate to call.             Sincerely,                             Divya Sloan DO  OB/GYN Resident, PGY1  [address], [place]  8/8/2022 2:27 PM

## 2022-08-08 NOTE — H&P
OBSTETRICAL HISTORY [facility]    Date: 2022       Time: 11:55 AM   Patient Name: Luís Parker     Patient : 1995  Room/Bed: [place] [address]    Admission Date/Time: 2022 11:33 AM      CC: Contractions     HPI: Luís Parker is a 27 y.o. [id number] at 33w4d who presents for contractions. She reports contractions that have lasted the last four-five days. The patient reports fetal movement is present, denies loss of fluid, denies vaginal bleeding. Patient denies headache, vision changes, nausea, vomiting, fever, chills, shortness of breath, chest pain, RUQ pain, abdominal pain, diarrhea, change in color/amount/odor of vaginal discharge, dysuria or, hematuria. DATING:  LMP: Patient's last menstrual period was 2021.   Estimated Date of Delivery: 22   Based on: early ultrasound, at 6 1/7 weeks GA    PREGNANCY RISK FACTORS:  Patient Active Problem List   Diagnosis    Acute cystitis without hematuria    Dysmenorrhea    Anxiety    Rh negative state in antepartum period    [facility]    Need for Tdap vaccination    GDMA1        Steroids Given In This Pregnancy:  no     REVIEW OF SYSTEMS:   Constitutional: negative fever, negative chills, negative weight changes   HEENT: negative visual disturbances, negative headaches, negative dizziness, negative hearing loss  Breast: Negative breast abnormalities, negative breast lumps, negative nipple discharge  Respiratory: negative dyspnea, negative cough, negative SOB  Cardiovascular: negative chest pain,  negative palpitations, negative arrhythmia, negative syncope   Gastrointestinal: positive abdominal pain, negative RUQ pain, negative N/V, negative diarrhea, negative constipation, negative bowel changes, negative heartburn   Genitourinary: negative dysuria, negative hematuria, negative urinary incontinence, negative vaginal discharge, negative vaginal bleeding or Jeannette Reasons, DO   Misc. Devices (BREAST PUMP) MISC 1 each by Does not apply route every 3 hours 6/30/22 6/30/23  Elidia Dakin, APRN - CNP   sertraline (ZOLOFT) 25 MG tablet Take 1 tablet by mouth daily for 7 days 6/16/22 6/23/22  Jeannette Reasons, DO   sertraline (ZOLOFT) 50 MG tablet Take 1 tablet by mouth daily 6/23/22   Jeannette Reasons, DO   magnesium oxide (MAG-OX) 400 (241.3 Mg) MG TABS tablet  3/19/22   Historical Provider, MD   Prenatal MV-Min-Fe Fum-FA-DHA (PRENATAL 1 PO) Take 1 tablet by mouth daily    Historical Provider, MD   doxyLAMINE succinate (UNISOM SLEEPTABS) 25 MG tablet Take 1 tablet by mouth nightly 3/18/22   LINDA Rosales CNP   vitamin B-6 (PYRIDOXINE) 50 MG tablet Take 1 tablet by mouth in the morning and at bedtime 3/18/22   LINDA Rosales CNP       FAMILY HISTORY:  family history includes Breast Cancer in her maternal great grandmother; Breast Cancer (age of onset: 79) in her paternal great grandmother; Diabetes type 2  in her maternal grandmother; Hypertension in her maternal grandmother; No Known Problems in her father, maternal grandfather, paternal grandfather, paternal grandmother, and sister; Other in her mother. SOCIAL HISTORY:   reports that she has never smoked. She has never used smokeless tobacco. She reports that she does not currently use alcohol. She reports that she does not currently use drugs.     VITALS:  Vitals:    08/08/22 1152 08/08/22 1432   BP: 122/76    Pulse: (!) 112 88   Resp: 18    Temp: 98.6 °F (37 °C)    TempSrc: Oral    SpO2: 99%          PHYSICAL EXAM:  Fetal Heart Monitor:  Baseline Heart Rate 135, moderate variability, present accelerations, variable deceleration x1  [place]: uterine irritability     General appearance:  no apparent distress, alert and cooperative  HEENT: head atraumatic, normocephalic, moist mucous membranes, trachea midline  Neurologic:  alert, oriented, normal speech, no focal findings or movement disorder noted  Lungs: No increased work of breathing, good air exchange, clear to auscultation bilaterally, no crackles or wheezing  Heart:  regular rate and rhythm and no murmur, rubs, gallops  Abdomen:  soft, gravid, non-tender, no rebound, guarding, or rigidity, no RUQ or epigastric tenderness, no signs or symptoms of abruption, no signs or symptoms of chorioamnionitis  Extremities:  no calf tenderness, non edematous, no varicosities, full range of motion in all four extremities  Musculoskeletal: Gross strength equal and intact throughout, no gross abnormalities, range of motion normal in hips, knees, shoulders and spine, CVA tenderness: none  Psychiatric: Mood appropriate, normal affect   Rectal Exam: not indicated  Pelvic Exam:   Chaperone for Intimate Exam: Chaperone was present for entire exam, Chaperone Name: Yovana Goodson RN  Sterile Speculum Exam:   Urethral meatus: normal appearing   Vulva: Normal hair distribution, normal appearing vulva, no masses, tenderness or lesions, normal clitoris   Vagina: Normal appearing vaginal mucosa without lesions, vaginal discharge noted in the posterior vault, no lacerations   Cervix: Normal appearing friable cervix without lesions, external os visibly closed, no lacerations or abnormal lesions visualized  Sterile Vaginal Exam:  Cervix: No cervical motion tenderness   Uterus: Is gravid, Normal size, shape, consistency and non-tender   Adnexa: Non-tender, no palpable masses  Cervix: Closed dilated, 0 % effaced, -3 station, posterior position (out of 3 station), firm consistency, FETAL POSITION: Cephalic, Membranes intact,     PRENATAL LAB RESULTS:   Blood Type/Rh: A neg  Antibody Screen: positive for Anti-D (Rhogam)  Hemoglobin, Hematocrit, Platelets: 33.8/14.6/430  Rubella: immune  T.  Pallidum, IgG: non-reactive  Hepatitis B Surface Antigen: non-reactive   Hepatitis C Antibody: non-reactive   HIV: non-reactive   Gonorrhea: negative  Chlamydia: negative  Urine culture: negative, date: 3/4/22    1 hour Glucose Tolerance Test:  170  3 hour Glucose Tolerance Test: Fasting 85/ 1hr 189/ 2 hr 183/ 3 hr 168    Group B Strep: not done  Cystic Fibrosis Screen: negative  Sickle Cell Screen: negative  First Trimester Screen: not done  QUAD: not done  MSAFP: negative  Non-Invasive Prenatal Testing: low risk for aneuploidy  Anatomy US: anterior placenta, 3VC, male gender, normal anatomy    ASSESSMENT & PLAN:  Netta Sharp is a 27 y.o. female [id number] at 33w4d here for r/o  labor   - GBS unknown / Rh negative / R immune   - No indication for GBS prophylaxis   - Pt tachycardic on arrival. Repeat HR normal. VS otherwise stable, afebrile. - Patient reported contractions for the last few days, states the are occurring at regular intervals. - SVE: closed/thick/high   - SSE showed closed cervical os, scant discharge in the posterior vault, cervix friable with manipulation   - Vaginitis collected and resulted as negative. GC/C collected and pending. UA collected with trace bacteria. Will send reflex UCx and send abx if indicated. - cEFM/TOCO: no contractions on the monitor   - FFN collected but not sent   - Patient was offered repeat SVE after two hours, she declined stating tylenol helped with the pain. - Given patient's findings on SVE and negative cultures, there is low suspicion for  labor.    - Will plan to discharge patient with labor precautions. Encourage clsoe outpatient follow up.     GDMA1   - Abn 1hr and 3hr GTT   - No medications, diet controlled    - Recommend close BS monitoring with FBS and 2hr PP   - Continue follow up with M    Recurrent Major Depressive Disorder /Anxiety   - Started on Zoloft 25mg 22   - Mood stable today   - Denies SI/HI    BMI 36    Patient Active Problem List    Diagnosis Date Noted    Carissa Lopez 2022     Priority: Medium    Need for Tdap vaccination 2022     Given       Episode of recurrent major depressive disorder ([facility] [address] 2022     Zoloft started 22       Rh negative state in antepartum period 2022     Rhogam       Dysmenorrhea 2021    Anxiety 2021    Acute cystitis without hematuria        Plan discussed with Dr. Ryan Rogers, who is agreeable. Steroids given this admission: No    Risks, benefits, alternatives and possible complications have been discussed in detail with the patient. Admission, and post admission procedures and expectations were discussed in detail. All questions were answered. Attending's Name: Dr. Lorena Vazquez DO  OB/GYN Resident, PGY1  [address], [place]  2022 11:55 AM    Senior Residents Attestation Statement  I was present with the resident physician during the history and exam. I discussed the findings and plans with the resident physician and agree as documented in their note. Aaron Alicia DO, PGY-4  Ob/Gyn Resident  Pager: [phone] [address] [facility], [employer]  2022 6:51 PM    Date: 2022  Time: 10:09 PM      Patient Name: Calista Zacarias  Patient : 1995  Room/Bed: [place] [address]  Admission Date/Time: 2022 11:33 AM        Attending Physician Statement  I have discussed the care of Calista Zacarias, including pertinent history and exam findings with the resident. I have reviewed and edited their note in the electronic medical record. The key elements of all parts of the encounter have been performed/reviewed by me . I agree with the assessment, plan and orders as documented by the resident. The level of care submitted represents to the best of my ability the care documented in the medical record today. GC Modifier. This service has been performed in part by a resident under the direction of a teaching physician.         Attending's Name:  Nico Hernandez DO

## 2022-08-08 NOTE — TELEPHONE ENCOUNTER
From: Jose Cardenas  To: Dr. Robinson Colon: 8/8/2022 9:24 AM EDT  Subject: Questions     Hello I was wondering if a nurse could call me so I could ask a few questions.

## 2022-08-08 NOTE — FLOWSHEET NOTE
Patient to triage room 1 per wheelchair, patient here with c/o intermittent ctx that started 4-5 days ago that have increased in intensity. No bleeding or leaking per patient, + fetal movement noted. EFM applied with FHTs of 152.  Will notify residents of patients arrival.

## 2022-08-08 NOTE — TELEPHONE ENCOUNTER
Spoke with patient, she is due to start her maternity leave next wednsday and wanted to be wrote off early due to [address] with pain and puking.  Pt should be evaluated on labor and delivery

## 2022-08-08 NOTE — FLOWSHEET NOTE
Patient ambulatory to private vehicle, discharge paperwork given and discussed. Patient verbalized understanding of following all scheduled appointments.

## 2022-08-08 NOTE — FLOWSHEET NOTE
Sterile spec, GCC/vaginitis collected as well as SVE per Dr. Cabrera Sessions. Patient tolerated well.

## 2022-08-09 LAB
C TRACH DNA GENITAL QL NAA+PROBE: NEGATIVE
CULTURE: NORMAL
N. GONORRHOEAE DNA: NEGATIVE
SPECIMEN DESCRIPTION: NORMAL
SPECIMEN DESCRIPTION: NORMAL

## 2022-08-15 NOTE — DISCHARGE SUMMARY
[] [facility]        Outpatient Physical                Therapy       [address]       Phone: [phone]       Fax: [phone] [x] [facility]       Promotion at [address] [place] [address]       Phone: [phone]       Fax: [phone] [] Joseph Hemphill [facility]      for Health Promotion     [address] [facility]     Phone: [phone]     Fax:  [phone]     Physical Therapy Discharge Note    Date: 8/15/2022      Patient: Carlie Rota  : 1995  MRN: [id number]    Physician: [address]: [facility]  Medical Diagnosis:   O99.891, M54.9 (ICD-10-CM) - Back pain affecting pregnancy, antepartum   Rehab Codes: M54.5, M54.59, M54.9, M79.1, R26.2NEC, R29.3, M62.81, R27.8, R20.2  Onset Date: 22                                             Next 's appt.: 22  Visit# / total visits:        Cancels/No shows:  Date of initial visit: 22                Date of final visit: 22       Discharge Status:     Pt failed to make additional appointments for therapy, despite provider voicemail's left on her cell, encouraging her to schedule. Pt. Is now discharged. Electronically signed by: Tung Best PT    If you have any questions or concerns, please don't hesitate to call.   Thank you for your referral.

## 2022-08-16 ENCOUNTER — HOSPITAL ENCOUNTER (EMERGENCY)
Age: 27
Discharge: HOME | End: 2022-08-16
Attending: EMERGENCY MEDICINE
Payer: COMMERCIAL

## 2022-08-16 ENCOUNTER — HOSPITAL ENCOUNTER (OUTPATIENT)
Age: 27
Discharge: HOME | End: 2022-08-17
Attending: OBSTETRICS & GYNECOLOGY | Admitting: OBSTETRICS & GYNECOLOGY
Payer: COMMERCIAL

## 2022-08-16 VITALS
OXYGEN SATURATION: 97 % | HEART RATE: 93 BPM | SYSTOLIC BLOOD PRESSURE: 114 MMHG | DIASTOLIC BLOOD PRESSURE: 78 MMHG | TEMPERATURE: 98.6 F | RESPIRATION RATE: 16 BRPM

## 2022-08-16 VITALS
DIASTOLIC BLOOD PRESSURE: 81 MMHG | RESPIRATION RATE: 20 BRPM | TEMPERATURE: 98.2 F | OXYGEN SATURATION: 95 % | HEART RATE: 94 BPM | SYSTOLIC BLOOD PRESSURE: 113 MMHG | BODY MASS INDEX: 36.65 KG/M2 | WEIGHT: 220 LBS | HEIGHT: 65 IN
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
## 2022-08-16 PROBLEM — O09.93 HIGH-RISK PREGNANCY IN THIRD TRIMESTER: Status: ACTIVE | Noted: 2022-08-16

## 2022-08-16 PROCEDURE — 99283 EMERGENCY DEPT VISIT LOW MDM: CPT

## 2022-08-16 PROCEDURE — 36415 COLL VENOUS BLD VENIPUNCTURE: CPT

## 2022-08-16 PROCEDURE — 85460 HEMOGLOBIN FETAL: CPT

## 2022-08-16 PROCEDURE — 6370000000 HC RX 637 (ALT 250 FOR IP): Performed by: STUDENT IN AN ORGANIZED HEALTH CARE EDUCATION/TRAINING PROGRAM

## 2022-08-16 PROCEDURE — 99236 HOSP IP/OBS SAME DATE HI 85: CPT | Performed by: OBSTETRICS & GYNECOLOGY

## 2022-08-16 RX ORDER — ACETAMINOPHEN 500 MG
1000 TABLET ORAL ONCE
Status: COMPLETED | OUTPATIENT
Start: 2022-08-16 | End: 2022-08-16

## 2022-08-16 RX ORDER — ONDANSETRON 2 MG/ML
4 INJECTION INTRAMUSCULAR; INTRAVENOUS EVERY 6 HOURS PRN
Status: DISCONTINUED | OUTPATIENT
Start: 2022-08-16 | End: 2022-08-17 | Stop reason: HOSPADM

## 2022-08-16 RX ORDER — ONDANSETRON 4 MG/1
4 TABLET, ORALLY DISINTEGRATING ORAL EVERY 8 HOURS PRN
Status: DISCONTINUED | OUTPATIENT
Start: 2022-08-16 | End: 2022-08-17 | Stop reason: HOSPADM

## 2022-08-16 RX ORDER — ACETAMINOPHEN 325 MG/1
650 TABLET ORAL EVERY 4 HOURS PRN
Status: DISCONTINUED | OUTPATIENT
Start: 2022-08-16 | End: 2022-08-17 | Stop reason: HOSPADM

## 2022-08-16 RX ADMIN — ACETAMINOPHEN 1000 MG: 500 TABLET ORAL at 20:57

## 2022-08-16 ASSESSMENT — PAIN DESCRIPTION - ORIENTATION: ORIENTATION: LOWER

## 2022-08-16 ASSESSMENT — PAIN - FUNCTIONAL ASSESSMENT: PAIN_FUNCTIONAL_ASSESSMENT: 0-10

## 2022-08-16 ASSESSMENT — PAIN DESCRIPTION - FREQUENCY: FREQUENCY: CONTINUOUS

## 2022-08-16 ASSESSMENT — PAIN DESCRIPTION - DESCRIPTORS: DESCRIPTORS: THROBBING;STABBING

## 2022-08-16 ASSESSMENT — PAIN SCALES - GENERAL: PAINLEVEL_OUTOF10: 5

## 2022-08-16 ASSESSMENT — PAIN DESCRIPTION - LOCATION: LOCATION: BACK

## 2022-08-17 LAB
% FETAL BLEED: NORMAL %
FETAL BLEED VOLUME: NORMAL ML
RHOGAM DOSES REQUIRED: 1

## 2022-08-17 PROCEDURE — 99213 OFFICE O/P EST LOW 20 MIN: CPT

## 2022-08-17 ASSESSMENT — ENCOUNTER SYMPTOMS
EYE REDNESS: 0
BACK PAIN: 1
COUGH: 0
SHORTNESS OF BREATH: 0
ABDOMINAL PAIN: 1
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
## 2022-08-17 NOTE — PROGRESS NOTES
Obstetric/Gynecology Resident Interval Note    Called by RN that patient is requesting to leave. Patient seen and examined while sitting up on the side of the bed. She is requesting discharge home and does not want to wait for the results of the Kleihauer-Betke stain. Discussed that the reason for the lab is to help determine if there has been a bleed with cross contamination of maternal and fetal blood cells that would indicate a possible abruption as well as the possible need for rhogam to prevent alloimmunization in a future pregnancy. Discussed risk of maternal or fetal injury or death. Patient verbalized understanding and is requesting discharge. Discharged as [address] Attending updated.      Keyonna Gordon DO  OB/GYN Resident, [address] [place] [address]  8/17/2022, 12:25 AM
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
## 2022-08-17 NOTE — ED PROVIDER NOTES
[facility] [facility]     Emergency Department     Faculty Attestation        I performed a history and physical examination of the patient and discussed management with the resident. I reviewed the residents note and agree with the documented findings and plan of care. Any areas of disagreement are noted on the chart. I was personally present for the key portions of any procedures. I have documented in the chart those procedures where I was not present during the key portions. I have reviewed the emergency nurses triage note. I agree with the chief complaint, past medical history, past surgical history, allergies, medications, social and family history as documented unless otherwise noted below. For Physician Assistant/ Nurse Practitioner cases/documentation I have personally evaluated this patient and have completed at least one if not all key elements of the E/M (history, physical exam, and MDM). Additional findings are as noted. PCP:  No primary care provider on file. Pertinent Comments:         Critical Care  None    This patient was evaluated in the Emergency Department for symptoms described in the history of present illness. He/she was evaluated in the context of the global COVID-19 pandemic, which necessitated consideration that the patient might be at risk for infection with the SARS-CoV-2 virus that causes COVID-19. Institutional protocols and algorithms that pertain to the evaluation of patients at risk for COVID-19 are in a state of rapid change based on information released by regulatory bodies including the CDC and federal and state organizations. These policies and algorithms were followed during the patient's care in the ED. (Please note that portions of this note were completed with a voice recognition program. Efforts were made to edit the dictations but occasionally words are mis-transcribed.  Whenever words are used in this note in any gender, they shall be construed as though they were used in the gender appropriate to the circumstances; and whenever words are used in this note in the singular or plural form, they shall be construed as though they were used in the form appropriate to the circumstances.)    MD Ebony Hogan  Attending Emergency Medicine Physician           Dani Trotter MD  08/16/22 8921
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
## 2022-08-17 NOTE — FLOWSHEET NOTE
Patient stating that she is uncomfortable and not wanting to wait for her test results. Dr Mayo Mesas in to talk to patient. Patient decided to sign AMA forms. Left ambulatory with her mother.

## 2022-08-18 ENCOUNTER — ROUTINE PRENATAL (OUTPATIENT)
Dept: OBGYN CLINIC | Age: 27
End: 2022-08-18
Payer: COMMERCIAL

## 2022-08-18 VITALS
HEART RATE: 84 BPM | WEIGHT: 228 LBS | BODY MASS INDEX: 37.94 KG/M2 | DIASTOLIC BLOOD PRESSURE: 81 MMHG | SYSTOLIC BLOOD PRESSURE: 121 MMHG

## 2022-08-18 DIAGNOSIS — F41.9 ANXIETY: Primary | ICD-10-CM

## 2022-08-18 DIAGNOSIS — Z3A.35 35 WEEKS GESTATION OF PREGNANCY: ICD-10-CM

## 2022-08-18 DIAGNOSIS — G47.10 EXCESSIVE SLEEPINESS: ICD-10-CM

## 2022-08-18 DIAGNOSIS — Z78.9 NEED FOR COMMUNITY RESOURCE: ICD-10-CM

## 2022-08-18 PROCEDURE — 99212 OFFICE O/P EST SF 10 MIN: CPT

## 2022-08-18 NOTE — PROGRESS NOTES
at next visit    PP contraception: wants iud  Infant feeding needs: already has breast pump rx  Denies si/hi    Patient Active Problem List    Diagnosis Date Noted    High-risk pregnancy in third trimester 08/16/2022     Priority: Medium    33 weeks gestation of pregnancy 08/08/2022     Priority: Medium    Obesity affecting pregnancy 08/08/2022     Priority: Medium    GDMA1 07/08/2022     Priority: Medium    Need for Tdap vaccination 07/07/2022     Overview Note:     Given 6/30      Episode of recurrent major depressive disorder ([facility] [address] 06/16/2022     Overview Note:     Zoloft started 6/16/22       Rh negative state in antepartum period 03/04/2022     Overview Note:     Rhogam 6/30      Dysmenorrhea 03/19/2021    Anxiety 03/19/2021    Acute cystitis without hematuria       Review of Systems   Constitutional:  Negative for chills, fatigue and fever. Genitourinary:  Negative for decreased urine volume, difficulty urinating, dyspareunia, dysuria, frequency, genital sores, hematuria, menstrual problem, pelvic pain, urgency, vaginal bleeding, vaginal discharge and vaginal pain. All other systems reviewed and are negative. Denies unusual headaches, vision changes, RUQ pain    Vitals:    08/18/22 1303   BP: 121/81   Pulse: 84   Weight: 228 lb (103.4 kg)     Physical Exam  Constitutional:       General: She is awake. She is not in acute distress. Appearance: Normal appearance. She is well-developed and well-groomed. She is not ill-appearing, toxic-appearing or diaphoretic. HENT:      Head: Normocephalic and atraumatic. Nose: Nose normal.   Eyes:      Extraocular Movements: Extraocular movements intact. Pulmonary:      Effort: Pulmonary effort is normal.   Musculoskeletal:         General: Normal range of motion. Cervical back: Normal range of motion. Neurological:      General: No focal deficit present. Mental Status: She is alert and oriented to person, place, and time.  Mental status is at

## 2022-08-19 ENCOUNTER — TELEPHONE (OUTPATIENT)
Dept: OBGYN CLINIC | Age: 27
End: 2022-08-19

## 2022-08-19 NOTE — TELEPHONE ENCOUNTER
----- Message from LINDA Arvizu - CNP sent at 8/18/2022 10:12 PM EDT -----  I am ordering a referral to dr Nam Henry for sleep medicene eval... seems like [facility] works well with his office even if he's not [facility]. Please send them the necessary info. Thanks!     Tania

## 2022-08-25 ENCOUNTER — ROUTINE PRENATAL (OUTPATIENT)
Dept: OBGYN CLINIC | Age: 27
End: 2022-08-25
Payer: COMMERCIAL

## 2022-08-25 ENCOUNTER — HOSPITAL ENCOUNTER (OUTPATIENT)
Age: 27
Setting detail: SPECIMEN
Discharge: HOME | End: 2022-08-25

## 2022-08-25 DIAGNOSIS — O99.213 OBESITY AFFECTING PREGNANCY IN THIRD TRIMESTER: ICD-10-CM

## 2022-08-25 DIAGNOSIS — Z67.11 TYPE A BLOOD, RH NEGATIVE: ICD-10-CM

## 2022-08-25 DIAGNOSIS — O09.93 HIGH-RISK PREGNANCY IN THIRD TRIMESTER: ICD-10-CM

## 2022-08-25 DIAGNOSIS — O24.410 DIET CONTROLLED GESTATIONAL DIABETES MELLITUS (GDM) IN THIRD TRIMESTER: ICD-10-CM

## 2022-08-25 DIAGNOSIS — Z3A.36 36 WEEKS GESTATION OF PREGNANCY: Primary | ICD-10-CM

## 2022-08-25 PROBLEM — Z3A.33 33 WEEKS GESTATION OF PREGNANCY: Status: RESOLVED | Noted: 2022-08-08 | Resolved: 2022-08-25

## 2022-08-25 PROCEDURE — 59025 FETAL NON-STRESS TEST: CPT | Performed by: STUDENT IN AN ORGANIZED HEALTH CARE EDUCATION/TRAINING PROGRAM

## 2022-08-25 PROCEDURE — 99213 OFFICE O/P EST LOW 20 MIN: CPT | Performed by: STUDENT IN AN ORGANIZED HEALTH CARE EDUCATION/TRAINING PROGRAM

## 2022-08-25 PROCEDURE — G8417 CALC BMI ABV UP PARAM F/U: HCPCS | Performed by: STUDENT IN AN ORGANIZED HEALTH CARE EDUCATION/TRAINING PROGRAM

## 2022-08-25 PROCEDURE — 1036F TOBACCO NON-USER: CPT | Performed by: STUDENT IN AN ORGANIZED HEALTH CARE EDUCATION/TRAINING PROGRAM

## 2022-08-25 PROCEDURE — G8427 DOCREV CUR MEDS BY ELIG CLIN: HCPCS | Performed by: STUDENT IN AN ORGANIZED HEALTH CARE EDUCATION/TRAINING PROGRAM

## 2022-08-25 NOTE — PROGRESS NOTES
Prenatal Visit    Lupillo Arvizu is a 27 y.o. female [id number] at 36w0d    The patient was seen and evaluated. Reports positive fetal movements. She denies headache, vision changes, RUQ pain, contractions, vaginal bleeding and leakage of fluid. The patient was instructed on fetal kick counts and was given a kick sheet to complete every 8 hours. She was instructed that the baby should move at a minimum of ten times within one hour after a meal. The patient was instructed to lay down on her left side twenty minutes after eating and count movements for up to one hour with a target value of ten movements. She was instructed to notify the office if she did not make that target after two attempts or if after any attempt there was less than four movements. The patient reports that the targets have been made. The patient already received the T-Dap Vaccine (27-36 weeks) this pregnancy. The patient declined the influenza vaccine this year. The problem list reflects the active issues addressed during today's visit. Allergies:  No Known Allergies    Vitals:    BP: 120/85  Weight: 229 lb (103.9 kg)  Fetal HR: RNST  Movement: Present  Presentation: Vertex       NST:   Fetal heart rate baseline: 130, moderate variability, accelerations present, decelerations variable    The tracing has been reviewed and is considered reactive. BPP done and found to be 8/8      Labs:  Group Beta Strep collection was done. Sensitivities for clindamycin and erythromycin were not ordered. Assessment & Plan:  Lupillo Arvizu is a 27 y.o. female [id number] at 36w0d   - GBS testing was ordered.   Patient Active Problem List    Diagnosis Date Noted    BMI 37 08/08/2022     Priority: Medium    GDMA1 07/08/2022     Priority: Medium    [facility] 06/16/2022     Priority: Medium     Zoloft started 6/16/22       Need for Tdap vaccination 07/07/2022     Given 6/30      Rh negative state in antepartum period 03/04/2022     Rhogam 6/30      Dysmenorrhea 03/19/2021    Anxiety 03/19/2021    Acute cystitis without hematuria       -  Next [facility] appt on 9/1/22.    - The ACIP recommended pregnant patients be included in phase 1C of vaccine distribution. This decision is supported by [facility] and ACOG. As of February 16, 2021, there have been over 30,000 pregnant patients included in the V-safe post COVID vaccination safety . Most (73%) reports to VAERS among pregnant women involved non-pregnancyspecific adverse events (e.g., local and systemic reactions). Miscarriage was the most frequently reported pregnancy-specific adverse event to VAERS; numbers are within the known background rates based on presumed COVID-19 vaccine doses administered to pregnant women. No unexpected pregnancy or infant outcomes have been observed related to  COVID-19 vaccination during pregnancy. Recommended patient proceed with vaccination. Return in about 1 week (around 9/1/2022) for ROB. Bunnie Harada,  [facility]   8/26/2022, 9:01 AM

## 2022-08-26 VITALS
WEIGHT: 229 LBS | HEIGHT: 65 IN | DIASTOLIC BLOOD PRESSURE: 85 MMHG | BODY MASS INDEX: 38.15 KG/M2 | SYSTOLIC BLOOD PRESSURE: 120 MMHG

## 2022-08-26 DIAGNOSIS — Z3A.36 36 WEEKS GESTATION OF PREGNANCY: ICD-10-CM

## 2022-08-29 LAB
CULTURE: NORMAL
SPECIMEN DESCRIPTION: NORMAL

## 2022-08-30 ENCOUNTER — TELEPHONE (OUTPATIENT)
Dept: BEHAVIORAL/MENTAL HEALTH CLINIC | Age: 27
End: 2022-08-30

## 2022-09-01 ENCOUNTER — ROUTINE PRENATAL (OUTPATIENT)
Dept: OBGYN CLINIC | Age: 27
End: 2022-09-01
Payer: COMMERCIAL

## 2022-09-01 ENCOUNTER — ROUTINE PRENATAL (OUTPATIENT)
Dept: PERINATAL CARE | Age: 27
End: 2022-09-01
Payer: COMMERCIAL

## 2022-09-01 VITALS
HEIGHT: 65 IN | DIASTOLIC BLOOD PRESSURE: 74 MMHG | RESPIRATION RATE: 16 BRPM | BODY MASS INDEX: 38.15 KG/M2 | HEART RATE: 104 BPM | SYSTOLIC BLOOD PRESSURE: 128 MMHG | WEIGHT: 229 LBS | TEMPERATURE: 97.3 F

## 2022-09-01 VITALS
WEIGHT: 230.2 LBS | DIASTOLIC BLOOD PRESSURE: 82 MMHG | BODY MASS INDEX: 38.31 KG/M2 | HEART RATE: 93 BPM | SYSTOLIC BLOOD PRESSURE: 124 MMHG

## 2022-09-01 DIAGNOSIS — Z3A.37 37 WEEKS GESTATION OF PREGNANCY: ICD-10-CM

## 2022-09-01 DIAGNOSIS — O99.213 OBESITY AFFECTING PREGNANCY IN THIRD TRIMESTER: ICD-10-CM

## 2022-09-01 DIAGNOSIS — O24.419 GESTATIONAL DIABETES MELLITUS (GDM), ANTEPARTUM, GESTATIONAL DIABETES METHOD OF CONTROL UNSPECIFIED: Primary | ICD-10-CM

## 2022-09-01 DIAGNOSIS — Z36.4 ANTENATAL SCREENING FOR FETAL GROWTH RETARDATION USING ULTRASONICS: ICD-10-CM

## 2022-09-01 DIAGNOSIS — Z13.89 ENCOUNTER FOR ROUTINE SCREENING FOR MALFORMATION USING ULTRASONICS: ICD-10-CM

## 2022-09-01 DIAGNOSIS — Z3A.37 37 WEEKS GESTATION OF PREGNANCY: Primary | ICD-10-CM

## 2022-09-01 PROCEDURE — 99213 OFFICE O/P EST LOW 20 MIN: CPT | Performed by: STUDENT IN AN ORGANIZED HEALTH CARE EDUCATION/TRAINING PROGRAM

## 2022-09-01 PROCEDURE — 99999 PR OFFICE/OUTPT VISIT,PROCEDURE ONLY: CPT | Performed by: OBSTETRICS & GYNECOLOGY

## 2022-09-01 PROCEDURE — 76816 OB US FOLLOW-UP PER FETUS: CPT | Performed by: OBSTETRICS & GYNECOLOGY

## 2022-09-01 PROCEDURE — 1036F TOBACCO NON-USER: CPT | Performed by: STUDENT IN AN ORGANIZED HEALTH CARE EDUCATION/TRAINING PROGRAM

## 2022-09-01 PROCEDURE — G8427 DOCREV CUR MEDS BY ELIG CLIN: HCPCS | Performed by: STUDENT IN AN ORGANIZED HEALTH CARE EDUCATION/TRAINING PROGRAM

## 2022-09-01 PROCEDURE — 76819 FETAL BIOPHYS PROFIL W/O NST: CPT | Performed by: OBSTETRICS & GYNECOLOGY

## 2022-09-01 PROCEDURE — G8417 CALC BMI ABV UP PARAM F/U: HCPCS | Performed by: STUDENT IN AN ORGANIZED HEALTH CARE EDUCATION/TRAINING PROGRAM

## 2022-09-01 NOTE — PROGRESS NOTES
Prenatal Visit    Luís Parker is a 27 y.o. female [id number] at 37w0d    The patient was seen and evaluated. Reports positive fetal movements. She denies headache, vision changes, RUQ pain, contractions, vaginal bleeding and leakage of fluid. The patient was instructed on fetal kick counts and was given a kick sheet to complete every 8 hours. She was instructed that the baby should move at a minimum of ten times within one hour after a meal. The patient was instructed to lay down on her left side twenty minutes after eating and count movements for up to one hour with a target value of ten movements. She was instructed to notify the office if she did not make that target after two attempts or if after any attempt there was less than four movements. The patient reports that the targets have been made. The patient already received the T-Dap Vaccine (27-36 weeks) this pregnancy. Flu shot due this fall. The problem list reflects the active issues addressed during today's visit. Allergies:  Patient has no known allergies. Vitals:    BP: 124/82  Weight: 230 lb 3.2 oz (104.4 kg)  Heart Rate: 93  Fetal HR: RNST  Movement: Present  Presentation: Vertex     NST:   Fetal heart rate baseline: 130, moderate variability, accelerations present, decelerations absent    The tracing has been reviewed and is considered reactive. Physical Exam:  SVE: N/A    Labs:  Group Beta Strep collection was done. Sensitivities for clindamycin and erythromycin were not ordered. The patient was found to be GBS: negative    Assessment & Plan:  Luís Parker is a 27 y.o. female [id number] at 37w0d   - [id number] 9/15/22 @ 0800 for GDMA1   - The ACIP recommended pregnant patients be included in phase 1C of vaccine distribution. This decision is supported by [facility] and ACOG. As of February 16, 2021, there have been over 30,000 pregnant patients included in the V-safe post COVID vaccination safety .  Most (73%) reports to VAERS among pregnant women involved non-pregnancyspecific adverse events (e.g., local and systemic reactions). Miscarriage was the most frequently reported pregnancy-specific adverse event to VAERS; numbers are within the known background rates based on presumed COVID-19 vaccine doses administered to pregnant women. No unexpected pregnancy or infant outcomes have been observed related to  COVID-19 vaccination during pregnancy. Recommended patient proceed with vaccination. Patient Active Problem List    Diagnosis Date Noted    BMI 37 08/08/2022     Priority: Medium    GDMA1 07/08/2022     Priority: Medium    Episode of recurrent major depressive disorder ([facility] [address] 06/16/2022     Priority: Medium     Zoloft started 6/16/22       Need for Tdap vaccination 07/07/2022     Given 6/30      Rh negative state in antepartum period 03/04/2022     Rhogam 6/30      Dysmenorrhea 03/19/2021    Anxiety 03/19/2021    Acute cystitis without hematuria      Return in about 1 week (around 9/8/2022) for SHARA Marinelli, [address] [facility]   9/1/2022, 4:27 PM

## 2022-09-01 NOTE — PROGRESS NOTES
Review of blood glucose not done since patient did not bring glucose monitoring log. Please continue to send blood glucose monitoring information to [facility] office so that insulin and/or dietary changes can be made if necessary weekly. Diabetic education/nutrition counseling completed. Continue recommended ADA diet therapy for diabetes. Compliance with regular prenatal care and blood sugar monitoring strongly encouraged.       Strongly advised patient to be compliant with blood sugar monitoring as previously advised to minimize the potential of adverse  outcomes (e.g. fetal demise/stillbirth, polyhydramnios/oligohydramnios, fetal growth abnormalities, pregnancy loss, hypertensive disorders of pregnancy, indicated  delivery, etc.), potential maternal morbidities, etc.

## 2022-09-01 NOTE — Clinical Note
Hospital: [facility] Date: 9/15/22 Time: 0800  IOL for GDMA1.   Thanks,  Sera Garner, [address] [facility]  9/1/2022, 4:27 PM

## 2022-09-01 NOTE — PROGRESS NOTES
Pt forgot blood sugars and states did not check FBS this am.  Pt states \"not really monitoring blood sugars\"    Pt did not give a urine specimen at the time of vitals.

## 2022-09-02 ENCOUNTER — TELEPHONE (OUTPATIENT)
Dept: OBGYN CLINIC | Age: 27
End: 2022-09-02

## 2022-09-02 NOTE — TELEPHONE ENCOUNTER
Spoke with patient and informed her that her IOL is scheduled for 9/15/22 at 9:00am. Patient agreed and said she would be there.

## 2022-09-09 ENCOUNTER — ROUTINE PRENATAL (OUTPATIENT)
Dept: OBGYN CLINIC | Age: 27
End: 2022-09-09
Payer: COMMERCIAL

## 2022-09-09 VITALS
BODY MASS INDEX: 38.27 KG/M2 | SYSTOLIC BLOOD PRESSURE: 114 MMHG | HEART RATE: 86 BPM | DIASTOLIC BLOOD PRESSURE: 74 MMHG | WEIGHT: 230 LBS

## 2022-09-09 DIAGNOSIS — O24.410 DIET CONTROLLED GESTATIONAL DIABETES MELLITUS (GDM) IN THIRD TRIMESTER: Primary | ICD-10-CM

## 2022-09-09 DIAGNOSIS — O99.213 OBESITY AFFECTING PREGNANCY IN THIRD TRIMESTER: ICD-10-CM

## 2022-09-09 PROCEDURE — 1036F TOBACCO NON-USER: CPT | Performed by: OBSTETRICS & GYNECOLOGY

## 2022-09-09 PROCEDURE — 59025 FETAL NON-STRESS TEST: CPT | Performed by: OBSTETRICS & GYNECOLOGY

## 2022-09-09 PROCEDURE — G8427 DOCREV CUR MEDS BY ELIG CLIN: HCPCS | Performed by: OBSTETRICS & GYNECOLOGY

## 2022-09-09 PROCEDURE — G8417 CALC BMI ABV UP PARAM F/U: HCPCS | Performed by: OBSTETRICS & GYNECOLOGY

## 2022-09-09 PROCEDURE — 99213 OFFICE O/P EST LOW 20 MIN: CPT | Performed by: OBSTETRICS & GYNECOLOGY

## 2022-09-09 NOTE — PROGRESS NOTES
Glenis is a  @ 38w1d who presents for [facility] visit. She denies LOF, VB or Ctxs.  + FM. She denies any complaints and is excited for her induction next week. She denies any fevers/chills, SOB, cough, sore throat, loss of taste/smell or sick contacts. Pt denies any HA, vision changes or RUQ pain. O:  Vitals:    22 1242   BP: 114/74   Pulse: 86     Gen: NAD  Abd: soft, nontender, gravid  Ext:  no edema    NST: 140, mod variability, accels, no decels. Category 1 FHTs, reactive. BP: 114/74  Weight: 230 lb (104.3 kg)  Heart Rate: 86  Patient Position: Sitting  Fetal HR: rnst  Movement: Present  Presentation: Vertex    A/P:  Patient Active Problem List    Diagnosis Date Noted    BMI 37 2022     Priority: Medium    GDMA1 2022     Priority: Medium    Need for Tdap vaccination 2022     Priority: Medium     Given       Episode of recurrent major depressive disorder ([facility] [address] 2022     Zoloft started 22       Rh negative state in antepartum period 2022     Rhogam       Dysmenorrhea 2021    Anxiety 2021    Acute cystitis without hematuria      Discussed updated COVID precautions and policies. Reviewed updated visitor policy. Encouraged social distancing and appropriate hand washing/hygiene practices. Reviewed symptoms suspicious for COVID infection. Discussed that ACOG, [facility], and the CDC recommend to not withold immunization in pregnant and breastfeeding women who meet criteria for receipt of the vaccine based on the ACIP recommended priority groups. All questions answered. Patient vocalized understanding.     IOL 9/15  Discussed s/sx that should prompt call to the office  Discussed kick counts  RTC in 3 wks  for PP    Maryam Mccloud MD

## 2022-09-15 ENCOUNTER — APPOINTMENT (OUTPATIENT)
Dept: LABOR AND DELIVERY | Age: 27
DRG: 560 | End: 2022-09-15
Payer: COMMERCIAL

## 2022-09-15 ENCOUNTER — HOSPITAL ENCOUNTER (INPATIENT)
Age: 27
LOS: 2 days | Discharge: HOME | DRG: 560 | End: 2022-09-17
Attending: OBSTETRICS & GYNECOLOGY | Admitting: STUDENT IN AN ORGANIZED HEALTH CARE EDUCATION/TRAINING PROGRAM
Payer: COMMERCIAL

## 2022-09-15 PROBLEM — Z3A.39 39 WEEKS GESTATION OF PREGNANCY: Status: ACTIVE | Noted: 2022-09-15

## 2022-09-15 LAB
ABO/RH: NORMAL
ABSOLUTE EOS #: 0.11 K/UL (ref 0–0.44)
ABSOLUTE IMMATURE GRANULOCYTE: 0.08 K/UL (ref 0–0.3)
ABSOLUTE LYMPH #: 1.56 K/UL (ref 1.1–3.7)
ABSOLUTE MONO #: 0.64 K/UL (ref 0.1–1.2)
AMPHETAMINE SCREEN URINE: NEGATIVE
ANTIBODY IDENTIFICATION: NORMAL
ANTIBODY SCREEN: POSITIVE
ARM BAND NUMBER: NORMAL
BARBITURATE SCREEN URINE: NEGATIVE
BASOPHILS # BLD: 1 % (ref 0–2)
BASOPHILS ABSOLUTE: 0.04 K/UL (ref 0–0.2)
BENZODIAZEPINE SCREEN, URINE: NEGATIVE
CANNABINOID SCREEN URINE: NEGATIVE
COCAINE METABOLITE, URINE: NEGATIVE
EOSINOPHILS RELATIVE PERCENT: 1 % (ref 1–4)
EXPIRATION DATE: NORMAL
FENTANYL URINE: NEGATIVE
GLUCOSE BLD-MCNC: 105 MG/DL (ref 65–105)
GLUCOSE BLD-MCNC: 91 MG/DL (ref 65–105)
GLUCOSE BLD-MCNC: 94 MG/DL (ref 65–105)
HCT VFR BLD CALC: 32.5 % (ref 36.3–47.1)
HCT VFR BLD CALC: 33.2 % (ref 36.3–47.1)
HEMOGLOBIN: 10.4 G/DL (ref 11.9–15.1)
HEMOGLOBIN: 11.4 G/DL (ref 11.9–15.1)
IMMATURE GRANULOCYTES: 1 %
LYMPHOCYTES # BLD: 18 % (ref 24–43)
MCH RBC QN AUTO: 26.5 PG (ref 25.2–33.5)
MCH RBC QN AUTO: 27.1 PG (ref 25.2–33.5)
MCHC RBC AUTO-ENTMCNC: 32 G/DL (ref 28.4–34.8)
MCHC RBC AUTO-ENTMCNC: 34.3 G/DL (ref 28.4–34.8)
MCV RBC AUTO: 79 FL (ref 82.6–102.9)
MCV RBC AUTO: 82.7 FL (ref 82.6–102.9)
METHADONE SCREEN, URINE: NEGATIVE
MONOCYTES # BLD: 7 % (ref 3–12)
NRBC AUTOMATED: 0 PER 100 WBC
NRBC AUTOMATED: 0 PER 100 WBC
OPIATES, URINE: NEGATIVE
OXYCODONE SCREEN URINE: NEGATIVE
PDW BLD-RTO: 15 % (ref 11.8–14.4)
PDW BLD-RTO: 15.1 % (ref 11.8–14.4)
PHENCYCLIDINE, URINE: NEGATIVE
PLATELET # BLD: 213 K/UL (ref 138–453)
PLATELET # BLD: ABNORMAL K/UL (ref 138–453)
PLATELET, FLUORESCENCE: NORMAL K/UL (ref 138–453)
PMV BLD AUTO: 10.7 FL (ref 8.1–13.5)
RBC # BLD: 3.93 M/UL (ref 3.95–5.11)
RBC # BLD: 4.2 M/UL (ref 3.95–5.11)
RBC # BLD: ABNORMAL 10*6/UL
SEG NEUTROPHILS: 72 % (ref 36–65)
SEGMENTED NEUTROPHILS ABSOLUTE COUNT: 6.32 K/UL (ref 1.5–8.1)
T. PALLIDUM, IGG: NONREACTIVE
TEST INFORMATION: NORMAL
WBC # BLD: 8.8 K/UL (ref 3.5–11.3)
WBC # BLD: 9.4 K/UL (ref 3.5–11.3)

## 2022-09-15 PROCEDURE — 85027 COMPLETE CBC AUTOMATED: CPT

## 2022-09-15 PROCEDURE — 82947 ASSAY GLUCOSE BLOOD QUANT: CPT

## 2022-09-15 PROCEDURE — 86850 RBC ANTIBODY SCREEN: CPT

## 2022-09-15 PROCEDURE — 86901 BLOOD TYPING SEROLOGIC RH(D): CPT

## 2022-09-15 PROCEDURE — 3E0DXGC INTRODUCTION OF OTHER THERAPEUTIC SUBSTANCE INTO MOUTH AND PHARYNX, EXTERNAL APPROACH: ICD-10-PCS | Performed by: STUDENT IN AN ORGANIZED HEALTH CARE EDUCATION/TRAINING PROGRAM

## 2022-09-15 PROCEDURE — 6360000002 HC RX W HCPCS

## 2022-09-15 PROCEDURE — 6370000000 HC RX 637 (ALT 250 FOR IP)

## 2022-09-15 PROCEDURE — 80307 DRUG TEST PRSMV CHEM ANLYZR: CPT

## 2022-09-15 PROCEDURE — 6370000000 HC RX 637 (ALT 250 FOR IP): Performed by: STUDENT IN AN ORGANIZED HEALTH CARE EDUCATION/TRAINING PROGRAM

## 2022-09-15 PROCEDURE — 85055 RETICULATED PLATELET ASSAY: CPT

## 2022-09-15 PROCEDURE — 86780 TREPONEMA PALLIDUM: CPT

## 2022-09-15 PROCEDURE — 36415 COLL VENOUS BLD VENIPUNCTURE: CPT

## 2022-09-15 PROCEDURE — 2580000003 HC RX 258

## 2022-09-15 PROCEDURE — 86870 RBC ANTIBODY IDENTIFICATION: CPT

## 2022-09-15 PROCEDURE — 1220000000 HC SEMI PRIVATE OB R&B

## 2022-09-15 PROCEDURE — 85025 COMPLETE CBC W/AUTO DIFF WBC: CPT

## 2022-09-15 PROCEDURE — 86900 BLOOD TYPING SEROLOGIC ABO: CPT

## 2022-09-15 PROCEDURE — 76937 US GUIDE VASCULAR ACCESS: CPT

## 2022-09-15 RX ORDER — ONDANSETRON 2 MG/ML
4 INJECTION INTRAMUSCULAR; INTRAVENOUS EVERY 6 HOURS PRN
Status: DISCONTINUED | OUTPATIENT
Start: 2022-09-15 | End: 2022-09-16 | Stop reason: HOSPADM

## 2022-09-15 RX ORDER — SODIUM CHLORIDE 9 MG/ML
INJECTION, SOLUTION INTRAVENOUS CONTINUOUS
Status: DISCONTINUED | OUTPATIENT
Start: 2022-09-15 | End: 2022-09-16

## 2022-09-15 RX ORDER — SODIUM CHLORIDE 0.9 % (FLUSH) 0.9 %
5-40 SYRINGE (ML) INJECTION PRN
Status: DISCONTINUED | OUTPATIENT
Start: 2022-09-15 | End: 2022-09-16 | Stop reason: HOSPADM

## 2022-09-15 RX ORDER — SODIUM CHLORIDE, SODIUM LACTATE, POTASSIUM CHLORIDE, CALCIUM CHLORIDE 600; 310; 30; 20 MG/100ML; MG/100ML; MG/100ML; MG/100ML
INJECTION, SOLUTION INTRAVENOUS CONTINUOUS
Status: DISCONTINUED | OUTPATIENT
Start: 2022-09-15 | End: 2022-09-15

## 2022-09-15 RX ORDER — SODIUM CHLORIDE 0.9 % (FLUSH) 0.9 %
5-40 SYRINGE (ML) INJECTION EVERY 12 HOURS SCHEDULED
Status: DISCONTINUED | OUTPATIENT
Start: 2022-09-15 | End: 2022-09-16 | Stop reason: HOSPADM

## 2022-09-15 RX ORDER — DIPHENHYDRAMINE HCL 25 MG
25 TABLET ORAL EVERY 4 HOURS PRN
Status: DISCONTINUED | OUTPATIENT
Start: 2022-09-15 | End: 2022-09-16 | Stop reason: HOSPADM

## 2022-09-15 RX ORDER — SODIUM CHLORIDE, SODIUM LACTATE, POTASSIUM CHLORIDE, AND CALCIUM CHLORIDE .6; .31; .03; .02 G/100ML; G/100ML; G/100ML; G/100ML
1000 INJECTION, SOLUTION INTRAVENOUS PRN
Status: DISCONTINUED | OUTPATIENT
Start: 2022-09-15 | End: 2022-09-16 | Stop reason: HOSPADM

## 2022-09-15 RX ORDER — SODIUM CHLORIDE 9 MG/ML
25 INJECTION, SOLUTION INTRAVENOUS PRN
Status: DISCONTINUED | OUTPATIENT
Start: 2022-09-15 | End: 2022-09-16 | Stop reason: HOSPADM

## 2022-09-15 RX ORDER — ACETAMINOPHEN 500 MG
1000 TABLET ORAL EVERY 6 HOURS PRN
Status: DISCONTINUED | OUTPATIENT
Start: 2022-09-15 | End: 2022-09-16 | Stop reason: HOSPADM

## 2022-09-15 RX ORDER — SODIUM CHLORIDE, SODIUM LACTATE, POTASSIUM CHLORIDE, AND CALCIUM CHLORIDE .6; .31; .03; .02 G/100ML; G/100ML; G/100ML; G/100ML
500 INJECTION, SOLUTION INTRAVENOUS PRN
Status: DISCONTINUED | OUTPATIENT
Start: 2022-09-15 | End: 2022-09-16 | Stop reason: HOSPADM

## 2022-09-15 RX ADMIN — Medication 25 MCG: at 11:31

## 2022-09-15 RX ADMIN — SODIUM CHLORIDE: 9 INJECTION, SOLUTION INTRAVENOUS at 19:30

## 2022-09-15 RX ADMIN — Medication 25 MCG: at 17:49

## 2022-09-15 RX ADMIN — SODIUM CHLORIDE: 9 INJECTION, SOLUTION INTRAVENOUS at 10:58

## 2022-09-15 RX ADMIN — ACETAMINOPHEN 1000 MG: 500 TABLET ORAL at 17:53

## 2022-09-15 RX ADMIN — Medication 1 MILLI-UNITS/MIN: at 23:02

## 2022-09-15 ASSESSMENT — PAIN SCALES - GENERAL: PAINLEVEL_OUTOF10: 2

## 2022-09-15 NOTE — DISCHARGE SUMMARY
Obstetric Discharge Summary  [facility]    Patient Name: Lupillo Arvizu  Patient : 1995  Primary Care Physician: No primary care provider on file. Admit Date: 9/15/2022    Principal Diagnosis: IUP at 39w0d, admitted for induction of labor 2/2 GDMA1     Her pregnancy has been complicated by:   Patient Active Problem List   Diagnosis    Acute cystitis without hematuria    Dysmenorrhea    Anxiety    Rh negative state in antepartum period    Episode of recurrent major depressive disorder ([address]    Need for Tdap vaccination    GDMA1    BMI 37    Rhneg/RI/GBSneg     w/ IUD 22 M Apg 8/9 Wt 9#8       Infection Present?: No  Hospital Acquired: No    Surgical Operations & Procedures:  Analgesia: epidural  Delivery Type: Spontaneous Vaginal Delivery: See Labor and Delivery Summary   Laceration(s): Absent    Consultations: Anesthesia    Pertinent Findings & Procedures:   Lupillo Arvizu is a 27 y.o. female [id number] at 39w0d admitted for induction of labor 2/2 GDMA1; received cytotec 25 mcg PO x2, pitocin, epidural, AROM. She delivered by spontaneous vaginal a Live Born infant on 22. Information for the patient's :  Nikole Baker [[id number]]   male   Birth Weight: 9 lb 2 oz (4.14 kg)     Apgars: 8 at 1 minute and 9 at 5 minutes.      Postpartum course: normal.      Course of patient: uncomplicated    Discharge to: Home    Readmission planned: no     Recommendations on Discharge:     Medications:      Medication List        START taking these medications      docusate sodium 100 MG capsule  Commonly known as: COLACE  Take 1 capsule by mouth 2 times daily     ibuprofen 600 MG tablet  Commonly known as: ADVIL;MOTRIN  Take 1 tablet by mouth every 6 hours as needed for Pain            CONTINUE taking these medications      blood glucose monitor kit and supplies  Dispense sufficient amount for indicated testing frequency plus additional to accommodate PRN testing needs. Dispense all needed supplies to include: monitor, strips, lancing device, lancets, control solutions, alcohol swabs. Testing QID, FBS and 2hrs after each meal(breakfast, lunch and supper)     Breast Pump Misc  1 each by Does not apply route every 3 hours     magnesium oxide 400 (240 Mg) MG tablet  Commonly known as: MAG-OX     PRENATAL 1 PO     sertraline 50 MG tablet  Commonly known as: Zoloft  Take 1 tablet by mouth daily            STOP taking these medications      doxyLAMINE succinate 25 MG tablet  Commonly known as: Unisom SleepTabs     vitamin B-6 50 MG tablet  Commonly known as: PYRIDOXINE               Where to Get Your Medications        These medications were sent to [facility] ([address] - P [phone] - F [phone] [address] [facility] () [facility] [place]      Phone: [phone]   docusate sodium 100 MG capsule  ibuprofen 600 MG tablet           Activity: pelvic rest x 6 weeks  Diet: regular diet  Follow up: 2 weeks for  postpartum    Condition on discharge: stable    Discharge date: 9/17/22    Gunjan Patel DO  Ob/Gyn Resident    Comments:  Home care and follow-up care were reviewed. Pelvic rest, and birth control were reviewed. Signs and symptoms of mastitis and post partum depression were reviewed. The patient is to notify her physician if any of these occur. The patient was counseled on secondary smoke risks and the increased risk of sudden infant death syndrome and respiratory problems to her baby with exposure. She was counseled on various alternate recommendations to decrease the exposure to secondary smoke to her children.

## 2022-09-15 NOTE — FLOWSHEET NOTE
PT ADMITTED TO ROOM 709 FROM ED VIA wheelchair for scheduled induction. PT ASSISTED INTO BED. ORIENTED TO ROOM AND SURROUNDINGS. PLAN OF CARE, INFANT SECURITY AND VISITING HOURS DISCUSSED. PT VERBALIZES UNDERSTANDING. CALL LIGHT IN REACH AND SIDE RAILS UP X2. Placed on EFM and TOCO. FHR 150s. Pt denies vaginal bleeding and loss of fluid.

## 2022-09-15 NOTE — CARE COORDINATION
ANTEPARTUM NOTE    39 weeks gestation of pregnancy [Z3A.39]    Glenis was admitted to L&D on 9/15/2022 for induction of labor 2/2 GDMA1  @ 39w0d    OB GYN Provider: Dr. Sajan Alexander    Will meet with patient after delivery to verify name/address/phone/insurance and discuss discharge planning. Anticipate DC home 2 nights after vaginal delivery or 4 nights after C/S delivery as long as hemodynamically stable.

## 2022-09-15 NOTE — H&P
OBSTETRICAL HISTORY [facility]    Date: 9/15/2022       Time: 8:44 AM   Patient Name: Justus Chapman     Patient : 1995  Room/Bed: [facility]/2085-69    Admission Date/Time: 9/15/2022  8:24 AM      CC: Induction of Labor    HPI: Justus Chapman is a 27 y.o. [id number] at 39w0d who presents for IOL due to  [address]. Patient denies any fever, chills, N/V, headaches, vision changes, chest pain, shortness of breath, RUQ pain, abdominal pain, and increased swelling/tenderness in bilateral lower extremities. Patient denies any vaginal discharge and any urinary complaints. The patient reports fetal movement is present, denies contractions, denies loss of fluid, denies vaginal bleeding. DATING:  LMP: Patient's last menstrual period was 2021.   Estimated Date of Delivery: 22   Based on: early ultrasound, at 6 1/7 weeks GA    PREGNANCY RISK FACTORS:  Patient Active Problem List   Diagnosis    Acute cystitis without hematuria    Dysmenorrhea    Anxiety    Rh negative state in antepartum period    Episode of recurrent major depressive disorder ([facility])    Need for Tdap vaccination    GDMA1    BMI 37        Steroids Given In This Pregnancy:  no     REVIEW OF SYSTEMS:   Constitutional: negative fever, negative chills, negative weight changes   HEENT: negative visual disturbances, negative headaches, negative dizziness, negative hearing loss  Breast: Negative breast abnormalities, negative breast lumps, negative nipple discharge  Respiratory: negative dyspnea, negative cough, negative SOB  Cardiovascular: negative chest pain,  negative palpitations, negative arrhythmia, negative syncope   Gastrointestinal: negative abdominal pain, negative RUQ pain, negative N/V, negative diarrhea, negative constipation, negative bowel changes, negative heartburn   Genitourinary: negative dysuria, negative hematuria, negative urinary incontinence, negative vaginal discharge, negative vaginal bleeding or spotting  Dermatological: negative rash, negative pruritis, negative mole or other skin changes  Hematologic: negative bruising  Immunologic/Lymphatic: negative recent illness, negative recent sick contact  Musculoskeletal: negative back pain, negative myalgias, negative arthralgias  Neurological:  negative dizziness, negative migraines, negative seizures, negative weakness  Behavior/Psych: negative depression, negative anxiety, negative SI, negative HI      OBSTETRIC HISTORY:   OB History    Para Term  AB Living   4 2 2 0 1 2   SAB IAB Ectopic Molar Multiple Live Births   1 0 0 0 0 2      # Outcome Date GA Lbr Avelino/2nd Weight Sex Delivery Anes PTL Lv   4 Current            3 SAB 2021           2 Term 2020 39w4d  7 lb 5 oz (3.317 kg) F Vag-Spont  N DONNELL      Birth Comments: IOL      Name: Edie   1 Term 2019 39w5d  8 lb (3.629 kg) M Vag-Spont  N DONNELL      Birth Comments: Spinal headache      Name: Kaushik Larson       PAST MEDICAL HISTORY:   has a past medical history of Asthma, Chronic kidney disease, Complication of anesthesia, Diet controlled gestational diabetes mellitus (GDM) in third trimester, Neurologic disorder, Postpartum depression, Stress incontinence, and Trauma. PAST SURGICAL HISTORY:   has a past surgical history that includes [place] tooth extraction and Tympanostomy tube placement. ALLERGIES:  has No Known Allergies. MEDICATIONS:  Prior to Admission medications    Medication Sig Start Date End Date Taking? Authorizing Provider   magnesium oxide (MAG-OX) 400 (240 Mg) MG tablet TAKE 1 TABLET BY MOUTH NIGHTLY 22   Historical Provider, MD   blood glucose monitor kit and supplies Dispense sufficient amount for indicated testing frequency plus additional to accommodate PRN testing needs. Dispense all needed supplies to include: monitor, strips, lancing device, lancets, control solutions, alcohol swabs.  Testing QID, FBS and 2hrs after each meal(breakfast, lunch and supper) 7/8/22   Tanya Bain DO   Misc. Devices (BREAST PUMP) MISC 1 each by Does not apply route every 3 hours 6/30/22 6/30/23  LINDA Junior CNP   sertraline (ZOLOFT) 50 MG tablet Take 1 tablet by mouth daily 6/23/22   Tanya Bain DO   Prenatal MV-Min-Fe Fum-FA-DHA (PRENATAL 1 PO) Take 1 tablet by mouth daily    Historical Provider, MD   doxyLAMINE succinate (UNISOM SLEEPTABS) 25 MG tablet Take 1 tablet by mouth nightly  Patient not taking: Reported on 9/9/2022 3/18/22   LINDA Rojas CNP   vitamin B-6 (PYRIDOXINE) 50 MG tablet Take 1 tablet by mouth in the morning and at bedtime 3/18/22   LINDA Rojas CNP       FAMILY HISTORY:  family history includes Breast Cancer in her maternal great grandmother; Breast Cancer (age of onset: 79) in her paternal great grandmother; Diabetes type 2  in her maternal grandmother; Hypertension in her maternal grandmother; No Known Problems in her father, maternal grandfather, paternal grandfather, paternal grandmother, and sister; Other in her mother. SOCIAL HISTORY:   reports that she has never smoked. She has never used smokeless tobacco. She reports that she does not currently use alcohol. She reports that she does not currently use drugs.     VITALS:  Vitals:    09/15/22 0849   BP: 116/85   Pulse: 98   Resp: 18   Temp: 98.4 °F (36.9 °C)   SpO2: 96%       PHYSICAL EXAM:  Fetal Heart Monitor:  Baseline Heart Rate 150, moderate variability, present accelerations, absent decelerations  [place]: none contractions    General appearance:  no apparent distress, alert, and cooperative  HEENT: head atraumatic, normocephalic, moist mucous membranes, trachea midline  Neurologic:  alert, oriented, normal speech, no focal findings or movement disorder noted  Lungs:  No increased work of breathing, normal effort, no respiratory distress  Heart:  regular rate and rhythm    Abdomen:  soft, non-tender, and no rebound, guarding, or rigidity, no signs of placenta abruption or chorioamnionitis  Extremities:  no calf tenderness, nonedematous  Musculoskeletal: Gross strength equal and intact throughout, no gross abnormalities, range of motion normal in hips, knees, shoulders and spine  Psychiatric: Mood appropriate, normal affect   Rectal Exam: not indicated  Pelvic Exam:  Chaperone for Intimate Exam: Chaperone was present for entire exam, Chaperone Name: Negrita Ovalle RN  Sterile Vaginal Exam:  Cervix: No cervical motion tenderness   Uterus: Is gravid, Normal size, shape, consistency and non-tender   Adnexa: Non-tender, no palpable masses  Cervix: Fingertip dilated, 30 % effaced, -2 station    LIMITED BEDSIDE US:  Position: Cephalic  Placental Location: anterior  Fetal Heart Tones: Present  Fetal Movement: Present  Amniotic Fluid Index/Volume: >2 cm  Estimated Fetal Weight:  8 lbs 3oz    PRENATAL LAB RESULTS:  Blood Type/Rh: A neg  Antibody Screen: positive for Anti-D, passive due to RhIG  Hemoglobin, Hematocrit, Platelets: 32.0/47.6/909  Rubella: immune  T.  Pallidum, IgG: non-reactive  Hepatitis B Surface Antigen: non-reactive   Hepatitis C Antibody: non-reactive   HIV: non-reactive   Gonorrhea: negative  Chlamydia: negative  Urine culture: negative, date: 3/4/22, 6/24/22, 8/8/22    1 hour Glucose Tolerance Test: 170  3 hour Glucose Tolerance Test: Fasting 85/1 hour 189/2 hour 183/3 hour 168    Group B Strep: negative on 8/25/22  Cystic Fibrosis Screen: negative  Sickle Cell Screen: negative  First Trimester Screen: low risk for aneuploidy  msAFP: negative  Non-Invasive Prenatal Testing: low risk for aneuploidy  Anatomy US: anterior placenta, 3VC, male gender, normal anatomy    ASSESSMENT & PLAN:  Lupillo Luh is a 27 y.o. female [id number] at 39w0d who presents for IOL due to [address] to labor and delivery under the service of Dr. Candy Erazo  - VSS, afebrile   - GBS negative / Rh negative / R immune   - No indication for GBS prophylaxis   - Rhogam work up post partum     - Cat 1 FHT and TOCO showing no contractions   - SVE: finger tip cm/30%/-3 station    - BSUS: cephalic presentation, anterior placenta, EFW 8#3   - CBC, T&S, T.Pal, UDS ordered   - UDS ordered. R/B/A discussed with patient and patient agreeable   - LR IVF @ 125 mL/hr  - Maintain cEFM and TOCO    - Plan of Induction: cytotec 25 mcg PO     GDMA1  - Follows with MFM  - POCT glucose, fasting and 2 hr PP  - Carb control diet     Rh negative state in antepartum period   - Rhogam given 6/30    Anxiety/Depression   - Patient reports she has not been taking Zoloft, stable on no meds   - Denies SI/HI   - Patient would like to restart Zoloft PP    Sleep Apnea    - Saw pulm 8/31/22, plan for outpatient sleep study     BMI 38      Patient Active Problem List    Diagnosis Date Noted    BMI 37 08/08/2022     Priority: Medium    GDMA1 07/08/2022     Priority: Medium    Need for Tdap vaccination 07/07/2022     Given 6/30      Episode of recurrent major depressive disorder ([facility] [address] 06/16/2022     Zoloft started 6/16/22       Rh negative state in antepartum period 03/04/2022     Rhogam 6/30      Dysmenorrhea 03/19/2021    Anxiety 03/19/2021    Acute cystitis without hematuria        Plan discussed with Dr. Darnell Varner, who is agreeable. Steroids given this admission: No    Risks, benefits, alternatives and possible complications have been discussed in detail with the patient. Admission, and post admission procedures and expectations were discussed in detail. All questions were answered. Attending's Name: Dr. Chelsie Hurt MD  Ob/Gyn Resident  9/15/2022, 8:44 AM     Attending Physician Statement  I have personally seen, evaluated and discussed the care of Kaleb Greenfield, including pertinent history and exam findings with the resident. I have reviewed and edited their note in the electronic medical record. The key elements of all parts of the encounter have been performed/reviewed by me.   I agree with the assessment, plan and orders as documented by the resident. The level of care submitted represents to the best of my ability the care documented in the medical record today. GC Modifier. This service has been performed in part by a resident under the direction of a teaching physician.         Attending's Name:  James Reilly DO  Date: 9/15/2022  Time: 7:20 PM    Patient Name: Jimy Chinchilla  Patient : 1995  Room/Bed: 5103/9185-58  Admission Date/Time: 9/15/2022  8:24 AM

## 2022-09-15 NOTE — PROGRESS NOTES
Labor Progress Note    Glenis Ryder is a 27 y.o. female [id number] at 39w0d  The patient was seen and examined. Her pain is well controlled. She reports fetal movement is present, denies contractions, denies loss of fluid, denies vaginal bleeding. Vital Signs:  Vitals:    09/15/22 0849 09/15/22 1355   BP: 116/85 107/61   Pulse: 98 88   Resp: 18 18   Temp: 98.4 °F (36.9 °C) 98.3 °F (36.8 °C)   TempSrc: Oral Oral   SpO2: 96% 97%         FHT: 150, moderate variability, accelerations present, decelerations absent  Contractions: no contractions    Chaperone for Intimate Exam: Chaperone was present for entire exam, Chaperone Name: Mykel JEFFREY  Cervical Exam: deferred  Pitocin: @ 0 mu/min    Membranes: Intact  Scalp Electrode in place: absent  Intrauterine Pressure Catheter in Place: absent    Interventions: none    Assessment/Plan:  Aidan Delacruz is a 27 y.o. female A6H9461 at 39w0d admitted for RR IOL due to GDMA1   - GBS negative, No indication for GBS prophylaxis   - VSS, afebrile   - cEFM/TOCO; cat 1   - s/p cytotec 25 mcg PO x1   - Ordered second dose of cytotec   - Continue current management    GDMA1   - Elevated 1 hr and 3 hr GTT   - POCT glucose, fasting and 2 hr PP    BMI 38    Attending and Senior Resident updated and in agreement with plan. Deirdre Richter MD  Ob/Gyn Resident  9/15/2022, 3:51 PM    Attending Physician Statement  I have personally seen, evaluated and discussed the care of [place], including pertinent history and exam findings with the resident. I have reviewed and edited their note in the electronic medical record. The key elements of all parts of the encounter have been performed/reviewed by me. I agree with the assessment, plan and orders as documented by the resident. The level of care submitted represents to the best of my ability the care documented in the medical record today. GC Modifier.   This service has been performed in part by a resident under the direction of a teaching physician. Patient seen and examined. S/p 2 Cytotec PO. Reports she is having mild uterine cramping. Denies needing any pain medications at this time. Reports great fetal movement. Baby tolerating cytotec. Blood sugars well controlled. [place] balloon explained to patient and she will consider it at her next check. Continue current care.  VSS    Attending's Name:  Lilly Church DO  Date: 9/15/2022  Time: 7:08 PM

## 2022-09-16 ENCOUNTER — ANESTHESIA (OUTPATIENT)
Dept: LABOR AND DELIVERY | Age: 27
DRG: 560 | End: 2022-09-16
Payer: COMMERCIAL

## 2022-09-16 ENCOUNTER — ANESTHESIA EVENT (OUTPATIENT)
Dept: LABOR AND DELIVERY | Age: 27
DRG: 560 | End: 2022-09-16
Payer: COMMERCIAL

## 2022-09-16 LAB
GLUCOSE BLD-MCNC: 65 MG/DL (ref 65–105)
GLUCOSE BLD-MCNC: 72 MG/DL (ref 65–105)
GLUCOSE BLD-MCNC: 84 MG/DL (ref 65–105)
GLUCOSE BLD-MCNC: 90 MG/DL (ref 65–105)

## 2022-09-16 PROCEDURE — 7200000001 HC VAGINAL DELIVERY

## 2022-09-16 PROCEDURE — 3700000025 EPIDURAL BLOCK: Performed by: ANESTHESIOLOGY

## 2022-09-16 PROCEDURE — 6360000002 HC RX W HCPCS: Performed by: ANESTHESIOLOGY

## 2022-09-16 PROCEDURE — 2500000003 HC RX 250 WO HCPCS: Performed by: NURSE ANESTHETIST, CERTIFIED REGISTERED

## 2022-09-16 PROCEDURE — 10907ZC DRAINAGE OF AMNIOTIC FLUID, THERAPEUTIC FROM PRODUCTS OF CONCEPTION, VIA NATURAL OR ARTIFICIAL OPENING: ICD-10-PCS | Performed by: STUDENT IN AN ORGANIZED HEALTH CARE EDUCATION/TRAINING PROGRAM

## 2022-09-16 PROCEDURE — 51701 INSERT BLADDER CATHETER: CPT

## 2022-09-16 PROCEDURE — 6360000002 HC RX W HCPCS

## 2022-09-16 PROCEDURE — 2500000003 HC RX 250 WO HCPCS

## 2022-09-16 PROCEDURE — 6360000002 HC RX W HCPCS: Performed by: NURSE ANESTHETIST, CERTIFIED REGISTERED

## 2022-09-16 PROCEDURE — 6370000000 HC RX 637 (ALT 250 FOR IP)

## 2022-09-16 PROCEDURE — 82947 ASSAY GLUCOSE BLOOD QUANT: CPT

## 2022-09-16 PROCEDURE — 6370000000 HC RX 637 (ALT 250 FOR IP): Performed by: STUDENT IN AN ORGANIZED HEALTH CARE EDUCATION/TRAINING PROGRAM

## 2022-09-16 PROCEDURE — 2580000003 HC RX 258

## 2022-09-16 PROCEDURE — 1220000000 HC SEMI PRIVATE OB R&B

## 2022-09-16 RX ORDER — ONDANSETRON 2 MG/ML
4 INJECTION INTRAMUSCULAR; INTRAVENOUS EVERY 4 HOURS PRN
Status: DISCONTINUED | OUTPATIENT
Start: 2022-09-16 | End: 2022-09-17 | Stop reason: HOSPADM

## 2022-09-16 RX ORDER — ACETAMINOPHEN 500 MG
1000 TABLET ORAL EVERY 6 HOURS
Status: DISCONTINUED | OUTPATIENT
Start: 2022-09-16 | End: 2022-09-17 | Stop reason: HOSPADM

## 2022-09-16 RX ORDER — ROPIVACAINE HYDROCHLORIDE 2 MG/ML
INJECTION, SOLUTION EPIDURAL; INFILTRATION; PERINEURAL PRN
Status: DISCONTINUED | OUTPATIENT
Start: 2022-09-16 | End: 2022-09-16 | Stop reason: SDUPTHER

## 2022-09-16 RX ORDER — IBUPROFEN 800 MG/1
800 TABLET ORAL EVERY 8 HOURS
Status: DISCONTINUED | OUTPATIENT
Start: 2022-09-16 | End: 2022-09-17 | Stop reason: HOSPADM

## 2022-09-16 RX ORDER — SIMETHICONE 80 MG
80 TABLET,CHEWABLE ORAL EVERY 6 HOURS PRN
Status: DISCONTINUED | OUTPATIENT
Start: 2022-09-16 | End: 2022-09-17 | Stop reason: HOSPADM

## 2022-09-16 RX ORDER — NALOXONE HYDROCHLORIDE 0.4 MG/ML
INJECTION, SOLUTION INTRAMUSCULAR; INTRAVENOUS; SUBCUTANEOUS PRN
Status: DISCONTINUED | OUTPATIENT
Start: 2022-09-16 | End: 2022-09-16 | Stop reason: HOSPADM

## 2022-09-16 RX ORDER — IBUPROFEN 600 MG/1
600 TABLET ORAL EVERY 6 HOURS PRN
Qty: 30 TABLET | Refills: 1 | Status: SHIPPED | OUTPATIENT
Start: 2022-09-16 | End: 2022-09-17 | Stop reason: SDUPTHER

## 2022-09-16 RX ORDER — KETOROLAC TROMETHAMINE 30 MG/ML
INJECTION, SOLUTION INTRAMUSCULAR; INTRAVENOUS
Status: COMPLETED
Start: 2022-09-16 | End: 2022-09-16

## 2022-09-16 RX ORDER — EPHEDRINE SULFATE/0.9% NACL/PF 50 MG/5 ML
10 SYRINGE (ML) INTRAVENOUS EVERY 5 MIN PRN
Status: DISCONTINUED | OUTPATIENT
Start: 2022-09-16 | End: 2022-09-16

## 2022-09-16 RX ORDER — SODIUM CHLORIDE 0.9 % (FLUSH) 0.9 %
5-40 SYRINGE (ML) INJECTION EVERY 12 HOURS SCHEDULED
Status: DISCONTINUED | OUTPATIENT
Start: 2022-09-16 | End: 2022-09-17 | Stop reason: HOSPADM

## 2022-09-16 RX ORDER — BISACODYL 10 MG
10 SUPPOSITORY, RECTAL RECTAL DAILY PRN
Status: DISCONTINUED | OUTPATIENT
Start: 2022-09-16 | End: 2022-09-17 | Stop reason: HOSPADM

## 2022-09-16 RX ORDER — LANOLIN 72 %
OINTMENT (GRAM) TOPICAL PRN
Status: DISCONTINUED | OUTPATIENT
Start: 2022-09-16 | End: 2022-09-17 | Stop reason: HOSPADM

## 2022-09-16 RX ORDER — SODIUM CHLORIDE 0.9 % (FLUSH) 0.9 %
5-40 SYRINGE (ML) INJECTION PRN
Status: DISCONTINUED | OUTPATIENT
Start: 2022-09-16 | End: 2022-09-17 | Stop reason: HOSPADM

## 2022-09-16 RX ORDER — SODIUM CHLORIDE 9 MG/ML
INJECTION, SOLUTION INTRAVENOUS PRN
Status: DISCONTINUED | OUTPATIENT
Start: 2022-09-16 | End: 2022-09-17 | Stop reason: HOSPADM

## 2022-09-16 RX ORDER — ROPIVACAINE HYDROCHLORIDE 2 MG/ML
INJECTION, SOLUTION EPIDURAL; INFILTRATION; PERINEURAL
Status: COMPLETED
Start: 2022-09-16 | End: 2022-09-16

## 2022-09-16 RX ORDER — KETOROLAC TROMETHAMINE 30 MG/ML
30 INJECTION, SOLUTION INTRAMUSCULAR; INTRAVENOUS ONCE
Status: DISCONTINUED | OUTPATIENT
Start: 2022-09-16 | End: 2022-09-17 | Stop reason: HOSPADM

## 2022-09-16 RX ORDER — LIDOCAINE HYDROCHLORIDE AND EPINEPHRINE 15; 5 MG/ML; UG/ML
INJECTION, SOLUTION EPIDURAL PRN
Status: DISCONTINUED | OUTPATIENT
Start: 2022-09-16 | End: 2022-09-16 | Stop reason: SDUPTHER

## 2022-09-16 RX ORDER — DOCUSATE SODIUM 100 MG/1
100 CAPSULE, LIQUID FILLED ORAL 2 TIMES DAILY
Qty: 60 CAPSULE | Refills: 1 | Status: SHIPPED | OUTPATIENT
Start: 2022-09-16 | End: 2022-09-17 | Stop reason: SDUPTHER

## 2022-09-16 RX ORDER — EPHEDRINE SULFATE 50 MG/ML
INJECTION INTRAVENOUS
Status: COMPLETED
Start: 2022-09-16 | End: 2022-09-16

## 2022-09-16 RX ORDER — CALCIUM CARBONATE 200(500)MG
500 TABLET,CHEWABLE ORAL 3 TIMES DAILY PRN
Status: DISCONTINUED | OUTPATIENT
Start: 2022-09-16 | End: 2022-09-16

## 2022-09-16 RX ORDER — DOCUSATE SODIUM 100 MG/1
100 CAPSULE, LIQUID FILLED ORAL 2 TIMES DAILY
Status: DISCONTINUED | OUTPATIENT
Start: 2022-09-16 | End: 2022-09-17 | Stop reason: HOSPADM

## 2022-09-16 RX ADMIN — SODIUM CHLORIDE: 9 INJECTION, SOLUTION INTRAVENOUS at 08:32

## 2022-09-16 RX ADMIN — EPHEDRINE SULFATE 10 MG: 50 INJECTION, SOLUTION INTRAVENOUS at 09:00

## 2022-09-16 RX ADMIN — SODIUM CHLORIDE: 9 INJECTION, SOLUTION INTRAVENOUS at 11:27

## 2022-09-16 RX ADMIN — ANTACID TABLETS 500 MG: 500 TABLET, CHEWABLE ORAL at 12:03

## 2022-09-16 RX ADMIN — LIDOCAINE HYDROCHLORIDE,EPINEPHRINE BITARTRATE 2 ML: 15; .005 INJECTION, SOLUTION EPIDURAL; INFILTRATION; INTRACAUDAL; PERINEURAL at 07:26

## 2022-09-16 RX ADMIN — LIDOCAINE HYDROCHLORIDE,EPINEPHRINE BITARTRATE 3 ML: 15; .005 INJECTION, SOLUTION EPIDURAL; INFILTRATION; INTRACAUDAL; PERINEURAL at 07:24

## 2022-09-16 RX ADMIN — DOCUSATE SODIUM 100 MG: 100 CAPSULE ORAL at 21:10

## 2022-09-16 RX ADMIN — ROPIVACAINE HYDROCHLORIDE 10 ML/HR: 2 INJECTION, SOLUTION EPIDURAL; INFILTRATION at 07:38

## 2022-09-16 RX ADMIN — LEVONORGESTREL 1 EACH: 52 INTRAUTERINE DEVICE INTRAUTERINE at 18:10

## 2022-09-16 RX ADMIN — ONDANSETRON 4 MG: 2 INJECTION INTRAMUSCULAR; INTRAVENOUS at 15:39

## 2022-09-16 RX ADMIN — KETOROLAC TROMETHAMINE 30 MG: 30 INJECTION, SOLUTION INTRAMUSCULAR; INTRAVENOUS at 18:08

## 2022-09-16 RX ADMIN — ONDANSETRON 4 MG: 2 INJECTION INTRAMUSCULAR; INTRAVENOUS at 08:29

## 2022-09-16 RX ADMIN — Medication 166.7 ML: at 18:07

## 2022-09-16 RX ADMIN — Medication 87.3 MILLI-UNITS/MIN: at 18:21

## 2022-09-16 RX ADMIN — ROPIVACAINE HYDROCHLORIDE 5 ML: 2 INJECTION, SOLUTION EPIDURAL; INFILTRATION; PERINEURAL at 07:37

## 2022-09-16 RX ADMIN — ACETAMINOPHEN 1000 MG: 500 TABLET ORAL at 21:10

## 2022-09-16 ASSESSMENT — PAIN SCALES - GENERAL
PAINLEVEL_OUTOF10: 5
PAINLEVEL_OUTOF10: 8

## 2022-09-16 ASSESSMENT — PAIN DESCRIPTION - LOCATION: LOCATION: RECTUM;PERINEUM

## 2022-09-16 NOTE — FLOWSHEET NOTE
Practice pushing some cervix on the left. Placed pt left side laying with right foot crossed. Will recheck in 20-30 mins per Dr. Eliane Souza.

## 2022-09-16 NOTE — FLOWSHEET NOTE
[address], CRNA at bedside. 3560 positioned for epidural. Timeout 0714.  0723  catheter placed. 1579 test dose given. 0737 loading dose given. 0732 positioned to low fowlers with left uterine displacement. 0740 pump initiated.

## 2022-09-16 NOTE — PROGRESS NOTES
Labor Progress Note    Glenis Haynes is a 27 y.o. female [id number] at 39w1d  The patient was seen and examined. Her pain is well controlled. She reports fetal movement is present, complains of contractions, denies loss of fluid, denies vaginal bleeding. Vital Signs:  Vitals:    09/16/22 0207 09/16/22 0300 09/16/22 0400 09/16/22 0530   BP: 111/68 114/67 (!) 112/55 115/67   Pulse: 77 86 97 99   Resp:       Temp:       TempSrc:       SpO2:             FHT: 120, moderate variability, accelerations present, decelerations absent  Contractions: every 2-4 minutes     Chaperone for Intimate Exam: Chaperone was present for entire exam, Chaperone Name: Yoselin Cortes RN   Cervical Exam: 3 cm dilated, 50 effaced, -2 station  Pitocin: @ 16 mu/min    Membranes: Intact  Scalp Electrode in place: absent  Intrauterine Pressure Catheter in Place: absent    Interventions: SVE    Assessment/Plan:  Darien Goldman is a 27 y.o. female P1P6676 at 39w1d admitted for RR IOL 2/2 GDMA1              - GBS negative, No indication for GBS prophylaxis              - VSS, afebrile               - cEFM/TOCO: category 1 FHT               - S/p cytotec 25 mcg PO x2               - SVE: 2/50/-1              - Pitocin per protocol    - Epidural ordered    - Assess for AROM after epidural is placed               - Continue current care     Attending updated and in agreement with plan    Mellisa Montemayor MD  Ob/Gyn Resident  9/16/2022, 6:04 AM      Attending Physician Statement  I have personally seen, evaluated and discussed the care of Darien Goldman, including pertinent history and exam findings with the resident. I have reviewed and edited their note in the electronic medical record. The key elements of all parts of the encounter have been performed/reviewed by me. I agree with the assessment, plan and orders as documented by the resident.      The level of care submitted represents to the best of my ability the care documented in the medical record today.    GC Modifier. This service has been performed in part by a resident under the direction of a teaching physician. Attending's Name:  Jhony Reagan,   Date: 9/16/2022  Time: 7:54 AM    Patient seen and examined this morning. S/p epidural, reports she is now very comfortable. Starting to feel pressure with contractions. Pit @ 16. Baby's tolerating labor well. Plan for AROM once appropriate.

## 2022-09-16 NOTE — PROGRESS NOTES
Labor Progress Note    Glenis Noyola is a 27 y.o. female F2T5145 at 39w1d  The patient was seen and examined. Her pain is well controlled. She reports fetal movement is present, complains of contractions, denies loss of fluid, denies vaginal bleeding. Vital Signs:  Vitals:    09/16/22 1441 09/16/22 1443 09/16/22 1500 09/16/22 1520   BP: (!) 76/46 123/74 112/64 116/70   Pulse: (!) 120 (!) 101 (!) 128 (!) 116   Resp: 16 17 16 18   Temp:       TempSrc:       SpO2:   97% 96%         FHT: 150, moderate variability, accelerations present, decelerations absent  Contractions: irregular, every 2-5 minutes  Cervical Exam: 4 cm dilated, 50 effaced, -1 station, cervical position posterior  Pitocin: @ 16 mu/min    Membranes: Ruptured clear fluid  Scalp Electrode in place: absent  Intrauterine Pressure Catheter in Place: absent    Interventions: AROM. Patient tolerated well.     Assessment/Plan:  Kelli Dennis is a 27 y.o. female N4H2609 at 36w3d IOL for GDMA1   - GBS negative, No indication for GBS prophylaxis   - Continue Pit per protocol   - AROM (clr ) @ 1000   - Epidural for pain control    GDMA1   - Q2 blood sugars   - Diabetic CLD    Bunnie Harada, DO [facility]   9/16/2022, 3:26 PM

## 2022-09-16 NOTE — ANESTHESIA PROCEDURE NOTES
Epidural Block    Patient location during procedure: OB  Start time: 9/16/2022 7:18 AM  End time: 9/16/2022 7:25 AM  Reason for block: labor epidural  Staffing  Performed: resident/CRNA   Anesthesiologist: Darwin Song MD  Resident/CRNA: LINDA Finley CRNA  Epidural  Patient position: sitting  Prep: Betadine  Patient monitoring: continuous pulse ox and frequent blood pressure checks  Approach: midline  Location: L3-4  Injection technique: SCHUYLER saline  Provider prep: mask and sterile gloves  Needle  Needle type: Tuohy   Needle gauge: 17 G  Needle length: 3.5 in  Needle insertion depth: 5 cm  Catheter type: side hole  Catheter size: 19 G  Catheter at skin depth: 11 cm  Test dose: negativeCatheter Secured: tegaderm and tape  Assessment  Hemodynamics: stable  Attempts: 1  Outcomes: uncomplicated and patient tolerated procedure well  Preanesthetic Checklist  Completed: patient identified, IV checked, site marked, risks and benefits discussed, surgical/procedural consents, equipment checked, pre-op evaluation, timeout performed, anesthesia consent given, oxygen available and monitors applied/VS acknowledged

## 2022-09-16 NOTE — L&D DELIVERY NOTE
Mother's Information      Labor Events     Labor?: No  Cervical Ripening:   Now               Sage Bales Pending [facility] [[id number]]      Labor Events     Labor?: No  Cervical Ripening Date/Time: 9/15/22 11:31:00   Cervical Ripening Type: Misoprostol  Antibiotics Received during Labor?: No  Rupture Date/Time: 22 09:57:00   Rupture Type: AROM  Fluid Color: Clear  Fluid Odor: None       Anesthesia    Method: Epidural       Start Pushing      Labor onset date/time:   Now     Dilation complete date/time:   Now     Start pushing date/time: 2022 16:40:00   Decision date/time (emergent ):           Delivery ()      Delivery Date/Time:          Details:            [place] Presentation    _: Anterior       Shoulder Dystocia    Add Second Maneuver  Add Third Maneuver  Add Fourth Maneuver  Add Fifth Maneuver  Add Sixth Maneuver  Add Seventh Maneuver  Add Eighth Maneuver  Add Ninth Maneuver       Assisted Delivery Details           Document Additional Attempt         Document Additional Attempt                 Cord           Placenta           Lacerations           Vaginal Counts        Sponges Needles Instruments   Initial Counts      Final Counts      If the count is incorrect due to Intentionally Retained Foreign Object (IRFO) add the IRFO LDA in Lines/Drains.   Add LDA: Link to [facility]       Blood Loss  Mother: Graham Hdez #[id number]     Start of Mother's Information      Delivery Blood Loss  22 0624 - 22 1824      None                 End of Mother's Information  Mother: Graham Hdez #[id number]                Delivery Providers    Delivering clinician:      Provider Role     Obstetrician     Primary Nurse     Primary [place] Nurse     NICU Nurse     Neonatologist     Anesthesiologist     Nurse Anesthetist     Nurse Practitioner     Midwife     Nursery Nurse               Assessment       Apgar Scoring Key:    0 1 2    Skin Color: Blue or pale Acrocyanotic Completely pink    Heart Rate: Absent <100 bpm >100 bpm    Reflex Irritability: No response Gr[place]ace Cry or active withdrawal    Muscle Tone: Limp Some flexion Active motion    Respiratory Effort: Absent Weak cry; hypoventilation Good, crying                      Skin Color:   Heart Rate:   Reflex Irritability:   Muscle Tone:   Respiratory Effort: Total:            1 Minute:        Apgar 1 total from OB History    5 Minute:        Apgar 5 total from OB History    10 Minute:              15 Minute:              20 Minute:                                 Resuscitation                Measurements      Birth Weight: 4140 g Birth Length: 0.565 m     Head Circumference: 0.3 m               Title      Skin to Skin Initiation Date/Time:       Skin to Skin End Date/Time:                      Vaginal Delivery Note  Department of Obstetrics and Gynecology  [address] [facility]       Patient: Darien Goldman   : 1995  MRN: [id number]   Date of delivery: 22     Pre-operative Diagnosis: Darien Goldman [id number] at 39w1d  Induction of Labor 2/2 GDMA1  Dysmenorrhea   Anxiety  Depression   BMI 38    Post-operative Diagnosis:  same as above, Mirena IUD insertion, and  living infant male    Delivering Obstetrician & Assistant(s): Dr. Brittany Bruno; Sae Rodriguez DO    Infant Information:   Information for the patient's :  Jaimie Menendez Pending [facility] [[id number]]      Information for the patient's :  Jaimie Menendez Pending [facility] [[id number]]        Apgar scores: 8 at 1 minute and 9 at 5 minutes. Anesthesia:  epidural anesthesia    Application and Delivery:    She was known to be GBS negative     The patient progressed well, received an epidural, became complete and felt the urge to push. After pushing with contractions the fetal head delivered Cephalic, right occiput anterior over an intact perineum, nuchal cord was not present.   The anterior, then posterior shoulder delivered easily and atraumatically followed by the rest of the infant. Nose and mouth suctioned with bulb suction, infant was stimulated and dried. Cord was clamped and cut after one minute delayed cord clamping and infant was attended by RN for evaluation. The delivery of the placenta was removed manually and appeared intact, whole, and that the umbilical cord had three vessels noted. Pitocin was started. The vagina was swept of all clots and debris. The perineum and vagina were evaluated and no  laceration was found Mother and baby tolerated procedure well. Patient had previously consented to [address] IUD placement 2/2 her history of dysmenorrhea. The Mirena, Lot # [id number], Expiration date 11/2024. IUD was opened and loaded into the delivery system. The wand was inserted just past the internal portio and the button was retracted to the first line. The wand was held in place for 10 seconds and then the button was retracted to its final position while the IUD was moved to the fundus. The string was trimmed in standard fashion. Dr. Brittany Bruno was present for entire delivery. Delivery Summary:       Specimen: cord blood and cord gases  Quantitative blood loss:  150ml immediately following delivery  Condition:  infant stable to general nursery and mother stable  Counts: instrument and sponge counts correct  Blood Type and Rh: A NEGATIVE    Rubella Immunity Status: immune  Infant Feeding: bottle feeding    Sae Rodriguez DO  Ob/Gyn Resident  9/16/2022, 6:25 PM    Date: 9/17/2022  Time: 8:53 AM    Attending Attestation:   I was present and scrubbed for the entire procedure.     Attending Name: Kosta Leal DO
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
## 2022-09-16 NOTE — PROGRESS NOTES
Labor Progress Note    Glenis Robertson is a 27 y.o. female Q9V4146 at 39w1d  The patient was seen and examined. Her pain is well controlled. She reports fetal movement is present, complains of contractions, complains of loss of fluid, denies vaginal bleeding. Vital Signs:  Vitals:    09/16/22 1100 09/16/22 1110 09/16/22 1120 09/16/22 1130   BP: (!) 101/57 (!) 95/55 (!) 108/52 (!) 97/42   Pulse: 90 97 95 96   Resp: 16 17 16 16   Temp:       TempSrc:       SpO2: 96% 95% 95% 96%         FHT: 160, moderate variability, accelerations present, decelerations absent  Contractions: regular, every 2-4 minutes    Chaperone for Intimate Exam: Chaperone was present for entire exam, Chaperone Name: Smooth Martinez RN  Cervical Exam: 1 cm dilated, 50 effaced, -1 station  Pitocin: @ 18 mu/min    Membranes: Ruptured clear fluid  Scalp Electrode in place: absent  Intrauterine Pressure Catheter in Place: absent    Interventions: SVE    Assessment/Plan:  Calista Zacarias is a 27 y.o. female T3V7895 at 39w1d admitted for RR IOL 2/2 GDMA1   - GBS negative, No indication for GBS prophylaxis   - cEFM/TOCO; borderline tachycardic   - s/p cytotec 25 mcg PO x2   - Pitocin per protocol   - Epidural in place   - Continue current management      Attending and Senior Residents updated and in agreement with plan.     Yamilet Brewer MD  Ob/Gyn Resident  9/16/2022, 11:55 AM

## 2022-09-16 NOTE — DISCHARGE INSTRUCTIONS
Depression After Childbirth: Care Instructions  It's common to lose sleep, feel irritable, and cry easily during the first few days after childbirth. Hormone changes and the demands of a new baby can cause these \"baby blues. \" If these mood changes last more than 2 weeks, you may have postpartum depression. This is a medical condition that requires treatment. If you have any of these signs, you may be depressed. See your doctor right away. You feel very sad or hopeless and lose interest in daily activities. You sleep too much or not enough. You feel tired or as if you have no energy. You eat too much or too little. You write or talk about death. Where to get help 24 hours a day, 7 days a week   If you or someone you know talks about suicide, self-harm, a mental health crisis, a substance use crisis, or any other kind of emotional distress, get help right away. You can:   Call the Suicide and Crisis Lifeline at 84 663107. Call 9-081-791-TALK (). 3-711.627.7045    Text HOME to 147504 to access the Crisis Text Line. Consider saving these numbers in your phone. What you can do   Try to go to all of your counseling sessions. Take medicines as directed. Eat healthy foods. Get daily exercise, such as walks. Try to get some sunlight every day. Avoid using alcohol or other substances. Get as much rest as possible. Connect with friends, and join a support group for new parents. When should you call for help? Call 834 if: You feel you cannot stop from hurting yourself, your baby, or someone else. Call your doctor now or seek immediate medical care if:    You are having trouble caring for yourself or your baby. You hear voices. Contact your doctor if:    You have problems with your medicines. You do not get better as expected. Follow-up care is a key part of your treatment and safety.  Be sure to make and go to all appointments, and call your doctor if you are having problems. It's also a good idea to know your test results and keep a list of the medicines you take. Where can you learn more? Go to https://chpepiceweb.[email or web address].com. org and sign in to your CoverMyMeds account. Enter G869 in the [facility] box to learn more about \"Depression After Childbirth: Care Instructions. \"     If you do not have an account, please click on the \"Sign Up Now\" link. Current as of: 2022               Content Version: 13.4   [employer]. Care instructions adapted under license by [facility] ([facility]). If you have questions about a medical condition or this instruction, always ask your healthcare professional. [address] any warranty or liability for your use of this information. After Your Delivery (the Postpartum Period): Care Instructions  Overview     Congratulations on the birth of your baby. Like pregnancy, the  period can be a time of excitement, kyler, and exhaustion. You may look at your wondrous little baby and feel happy. You may also be overwhelmed by your new sleep hours and new responsibilities. At first, babies often sleep during the days and are awake at night. They do not have a pattern or routine. They may make sudden gasps, jerk themselves awake, or look like they have crossed eyes. These are all normal, and they may even make you smile. In these first weeks after delivery, try to take good care of yourself. It may take 4 to 6 weeks to feel like yourself again, and possibly longer if you had a  birth. You will likely feel very tired for several weeks. Your days will be full of ups and downs, but lots of kyler as well. Follow-up care is a key part of your treatment and safety. Be sure to make and go to all appointments, and call your doctor if you are having problems. It's also a good idea to know your test results and keep a list of the medicines you take.   How can you care for yourself at home? Take care of your body after delivery  Use pads instead of tampons for the bloody flow that may last as long as 2 weeks. Ease cramps with ibuprofen (Advil, Motrin). Ease soreness of hemorrhoids and the area between your vagina and rectum with ice compresses or witch hazel pads. Ease constipation by drinking lots of fluid and eating high-fiber foods. Ask your doctor about over-the-counter stool softeners. Cleanse yourself with a gentle squeeze of warm water from a bottle instead of wiping with toilet paper. Take a sitz bath in warm water several times a day. Wear a good nursing bra. Ease sore and swollen breasts with warm, wet washcloths. If you aren't breastfeeding, use ice rather than heat for breast soreness. Your period may not start for several months if you are breastfeeding. You may bleed more, and longer at first, than you did before you got pregnant. Wait until you are healed (about 4 to 6 weeks) before you have sex. Ask your doctor when it is okay for you to have sex. Try not to travel with your baby for 5 or 6 weeks. If you take a long car trip, make frequent stops to walk around and stretch. Avoid exhaustion  Rest every day. Try to nap when your baby naps. Ask another adult to be with you for a few days after delivery. Plan for  if you have other children. Stay flexible so you can eat at odd hours and sleep when you need to. Both you and your baby are making new schedules. Plan small trips to get out of the house. Change can make you feel less tired. Ask for help with housework, cooking, and shopping. Remind yourself that your job is to care for your baby. Know about help for postpartum depression  \"Baby blues\" are common for the first 1 to 2 weeks after birth. You may cry or feel sad or irritable for no reason. Rest whenever you can. Being tired makes it harder to handle your emotions. Go for walks with your baby.   Talk to your partner, friends, and family about your feelings. If your symptoms last for more than a few weeks, or if you feel very depressed, ask your doctor for help. Postpartum depression can be treated. Support groups and counseling can help. Sometimes medicine can also help. Stay healthy  Eat healthy foods so you have more energy. If you breastfeed, avoid drugs. If you quit smoking during pregnancy, try to stay smoke-free. If you choose to have a drink now and then, have only one drink, and limit the number of occasions that you have a drink. Wait to breastfeed at least 2 hours after you have a drink to reduce the amount of alcohol the baby may get in the milk. Start daily exercise after 4 to 6 weeks, but rest when you feel tired. Learn exercises to tone your belly. Try Kegel exercises to regain strength in your pelvic muscles. You can do these exercises while you stand or sit. (If doing these exercises causes pain, stop doing them and talk with your doctor.)  Squeeze your muscles as if you were trying not to pass gas. Or squeeze your muscles as if you were stopping the flow of urine. Your belly, legs, and buttocks shouldn't move. Hold the squeeze for 3 seconds, then relax for 5 to 10 seconds. Start with 3 seconds, then add 1 second each week until you are able to squeeze for 10 seconds. Repeat the exercise 10 times a session. Do 3 to 8 sessions a day. Find a class for you and your baby that has an exercise time. If you had a  birth, give yourself a bit more time before you exercise, and be careful. When should you call for help? Share this information with your partner, family, or a friend. They can help you watch for warning signs. Call 911  anytime you think you may need emergency care. For example, call if:    You have thoughts of harming yourself, your baby, or another person. You passed out (lost consciousness). You have chest pain, are short of breath, or cough up blood. You have a seizure.    Call your doctor now or seek immediate medical care if:    You have signs of hemorrhage (too much bleeding), such as:  Heavy vaginal bleeding. This means that you are soaking through one or more pads in an hour. Or you pass blood clots bigger than an egg. Feeling dizzy or lightheaded, or you feel like you may faint. Feeling so tired or weak that you cannot do your usual activities. A fast or irregular heartbeat. New or worse belly pain. You have signs of infection, such as:  A fever. Vaginal discharge that smells bad. New or worse belly pain. You have symptoms of a blood clot in your leg (called a deep vein thrombosis), such as:  Pain in the calf, back of the knee, thigh, or groin. Redness and swelling in your leg or groin. You have signs of preeclampsia, such as:  Sudden swelling of your face, hands, or feet. New vision problems (such as dimness, blurring, or seeing spots). A severe headache. Watch closely for changes in your health, and be sure to contact your doctor if:    Your vaginal bleeding isn't decreasing. You feel sad, anxious, or hopeless for more than a few days. You are having problems with your breasts or breastfeeding. Where can you learn more? Go to https://[employer]eb.[employer]. org and sign in to your Ulule account. Enter T796 in the [email or web address] box to learn more about \"After Your Delivery (the Postpartum Period): Care Instructions. \"     If you do not have an account, please click on the \"Sign Up Now\" link. Current as of: February 23, 2022               Content Version: 13.4  © 6587-3392 Healthwise, Incorporated. Care instructions adapted under license by [facility] ([facility]). If you have questions about a medical condition or this instruction, always ask your healthcare professional. [place] [address] any warranty or liability for your use of this information.

## 2022-09-16 NOTE — PROGRESS NOTES
POST PARTUM DAY # 1    Glenis Meza is a 27 y.o. female  This patient was seen & examined today.  on 22    Her pregnancy was complicated by:   Patient Active Problem List   Diagnosis    Acute cystitis without hematuria    Dysmenorrhea    Anxiety    Rh negative state in antepartum period    Episode of recurrent major depressive disorder ([facility] [address]    Need for Tdap vaccination    GDMA1    BMI 37    Rhneg/RI/GBSneg     w/ IUD 22 M Apg  Wt 9#8       Today she is doing well without any chief complaint. Her lochia is light. She denies chest pain, shortness of breath, headache, lightheadedness and blurred vision. She is bottle feeding and she denies any breast tenderness. She is ambulating well. Her voiding pattern is normal. I reviewed signs and symptoms of post partum depression with the patient, she currently denies any of these symptoms. She is tolerating solids.      Vital Signs:  Vitals:    22 1930 22   BP: 116/65 123/74 121/69 117/75   Pulse: 82 83 76 70   Resp:    Temp:       TempSrc:       SpO2:             Physical Exam:  General:  no apparent distress, alert and cooperative  Neurologic:  alert, oriented, normal speech, no focal findings or movement disorder noted  Lungs:  No increased work of breathing, good air exchange, clear to auscultation bilaterally, no crackles or wheezing  Heart:  Normal apical impulse, regular rate and rhythm, normal S1 and S2, no S3 or S4, and no murmur noted    Abdomen: abdomen soft, non-distended, non-tender  Fundus: non-tender, firm, below umbilicus  Extremities:  no calf tenderness, non edematous    Lab:  Lab Results   Component Value Date    HGB 10.4 (L) 09/15/2022     Lab Results   Component Value Date    HCT 32.5 (L) 09/15/2022       Assessment/Plan:  Galen Dunham is a [id number] PPD # 1 s/p    - Doing well, VSS   - male infant in [address], circumcision desired   - Encourage ambulation   - Postpartum Hgb/Hct if symptomatic  Rh positive/Rubella immune  Bottle feeding  Dysmenorrhea    - IUD placed postpartum   Anxiety/Depression    - Zoloft 50 mg restarted PP    - Denies SI/HI  Continue post partum care    Counseling Completed:  Secondary Smoke risks and Sudden Infant Death Syndrome were reviewed with recommendations. Infant sleeping, \"back to sleep\" and avoidance of co-sleeping recommendations were reviewed. Signs and Symptoms of Post Partum Depression were reviewed. The patient is to call if any occur. Signs and symptoms of Mastitis were reviewed. The patient is to call if any occur for follow up.   Discharge instructions including pelvic rest, no driving with pain medicine and office follow-up were reviewed with patient     Attending Physician: Dr. Lucia Yang DO  Ob/Gyn Resident   9/17/2022, 12:29 AM

## 2022-09-16 NOTE — ANESTHESIA POSTPROCEDURE EVALUATION
Department of Anesthesiology  Postprocedure Note    Patient: Lupillo Arvizu  MRN: [id number]  YOB: 1995  Date of evaluation: 9/16/2022      Procedure Summary     Date: 09/16/22 Room / Location:     Anesthesia Start: 0703 Anesthesia Stop: 8653    Procedure: Labor Analgesia Diagnosis:     Scheduled Providers:  Responsible Provider: Johana Blair MD    Anesthesia Type: epidural ASA Status: 2          Anesthesia Type: No value filed.     Le Phase I:      Le Phase II:        Anesthesia Post Evaluation    Patient location during evaluation: bedside  Patient participation: complete - patient participated  Level of consciousness: awake and alert  Pain score: 0  Airway patency: patent  Nausea & Vomiting: no nausea and no vomiting  Complications: no  Cardiovascular status: blood pressure returned to baseline and hemodynamically stable  Respiratory status: acceptable and room air  Hydration status: euvolemic

## 2022-09-16 NOTE — PROGRESS NOTES
Labor Progress Note    Glenis Wagner is a 27 y.o. female [id number] at 39w0d  The patient was seen and examined. Her pain is well controlled. She reports fetal movement is present, complains of contractions, denies loss of fluid, denies vaginal bleeding.        Vital Signs:  Vitals:    09/15/22 0849 09/15/22 1355 09/15/22 1617   BP: 116/85 107/61 (!) 104/59   Pulse: 98 88 83   Resp: 18 18 18   Temp: 98.4 °F (36.9 °C) 98.3 °F (36.8 °C) 98.1 °F (36.7 °C)   TempSrc: Oral Oral Oral   SpO2: 96% 97% 97%       FHT: 140, moderate variability, accelerations present, decelerations absent  Contractions: intermittent     Chaperone for Intimate Exam: Chaperone was present for entire exam, Chaperone Name: Anali Barillas RN   Cervical Exam: 2 cm dilated, 50 effaced, -1 station    Membranes: Intact  Scalp Electrode in place: absent  Intrauterine Pressure Catheter in Place: absent    Interventions: SVE     Assessment/Plan:  Mark Horan is a 27 y.o. female [id number] at 39w0d admitted for RR IOL 2/2 GDMA1   - GBS negative, No indication for GBS prophylaxis   - VSS, afebrile    - cEFM/TOCO: category 1 FHT    - S/p cytotec 25 mcg PO x2    - SVE: 2/50/-1   - Pitocin per protocol    - Continue current care     BMI 38       Senior resident updated and in agreement with plan, attending to be notified     Akhil Birch MD  Ob/Gyn Resident  9/15/2022, 10:35 PM

## 2022-09-17 PROCEDURE — 59409 OBSTETRICAL CARE: CPT | Performed by: STUDENT IN AN ORGANIZED HEALTH CARE EDUCATION/TRAINING PROGRAM

## 2022-09-17 PROCEDURE — 6370000000 HC RX 637 (ALT 250 FOR IP): Performed by: STUDENT IN AN ORGANIZED HEALTH CARE EDUCATION/TRAINING PROGRAM

## 2022-09-17 PROCEDURE — 58300 INSERT INTRAUTERINE DEVICE: CPT | Performed by: STUDENT IN AN ORGANIZED HEALTH CARE EDUCATION/TRAINING PROGRAM

## 2022-09-17 RX ORDER — DOCUSATE SODIUM 100 MG/1
100 CAPSULE, LIQUID FILLED ORAL 2 TIMES DAILY
Qty: 60 CAPSULE | Refills: 1 | Status: SHIPPED | OUTPATIENT
Start: 2022-09-17 | End: 2022-11-16

## 2022-09-17 RX ORDER — IBUPROFEN 600 MG/1
600 TABLET ORAL EVERY 6 HOURS PRN
Qty: 30 TABLET | Refills: 1 | Status: SHIPPED | OUTPATIENT
Start: 2022-09-17

## 2022-09-17 RX ADMIN — IBUPROFEN 800 MG: 800 TABLET, FILM COATED ORAL at 10:23

## 2022-09-17 RX ADMIN — SERTRALINE 50 MG: 50 TABLET, FILM COATED ORAL at 10:23

## 2022-09-17 RX ADMIN — ACETAMINOPHEN 1000 MG: 500 TABLET ORAL at 10:23

## 2022-09-17 RX ADMIN — ACETAMINOPHEN 1000 MG: 500 TABLET ORAL at 16:52

## 2022-09-17 RX ADMIN — DOCUSATE SODIUM 100 MG: 100 CAPSULE ORAL at 10:23

## 2022-09-17 RX ADMIN — IBUPROFEN 800 MG: 800 TABLET, FILM COATED ORAL at 00:54

## 2022-09-17 RX ADMIN — BENZOCAINE AND LEVOMENTHOL: 200; 5 SPRAY TOPICAL at 00:53

## 2022-09-17 ASSESSMENT — PAIN SCALES - GENERAL
PAINLEVEL_OUTOF10: 2

## 2022-09-17 NOTE — FLOWSHEET NOTE
I have reviewed all AWHON[facility] Post-Birth Warning Signs and essential teaching points for pulmonary embolism, cardiac disease, hypertensive disorders of pregnancy, obstetric hemorrhage, venous thromboembolism, infection, and postpartum depression with the patient. I have informed the patient on when to call their healthcare provider and when to call 911. I have discussed with the patient  the importance of scheduling a follow-up visit with their physician, nurse practitioner or midwife and provided them with correct contact information for appointment. I have provided the patient with a copy of the \"Save Your Life\" handout. The patient has acknowledged receiving and understanding this education with her signature. Discharge instructions and follow up care reviewed.

## 2022-09-17 NOTE — PLAN OF CARE
Problem: Discharge Planning  Goal: Discharge to home or other facility with appropriate resources  Outcome: Completed     Problem: Chronic Conditions and Co-morbidities  Goal: Patient's chronic conditions and co-morbidity symptoms are monitored and maintained or improved  Outcome: Completed

## 2022-09-17 NOTE — CARE COORDINATION
CASE MANAGEMENT POST-PARTUM TRANSITIONAL CARE PLAN    39 weeks gestation of pregnancy [Z3A.39]    OB Provider: Jonathan  met w/ Glenis at bedside to discuss DCP. She is S/P  on 2022    Writer verified name/address/phone number correct on facesheet. She states she lives with her parents and her two children. Glenis verbalized no problems with transportation to and from doctors appointments or with paying for medications upon discharge home. [employer] insurance correct. Writer notified Miracle she has 30 days from date of birth to add  to insurance policy. She verbalized understanding. Glenis confirmed a safe place for infant to sleep at home. Infant name on BC: Amada Leonard. Infant PCP Dr. Kaleb Henley.      DME: has glucometer for gest diabetes  HOME CARE: no    Anticipate DC of couplet 2022    Readmission Risk              Risk of Unplanned Readmission:  7

## 2022-09-17 NOTE — PROGRESS NOTES
Obstetric/Gynecology Resident Interval Note    Notified by nurse patient's EPDS score 14. Patient evalauted at bedside, stating has good home support, mood stable on medication, denies suicidal / homicidal ideations at this time. Patient given strict precautions for postpartum depression or worsening mood. Patient agreeable to information. Stable for discharge at this time.      Heron Juarez DO  OB/GYN Resident, PGY1  [facility]  9/17/2022, 6:13 PM

## 2022-09-21 VITALS
TEMPERATURE: 98.1 F | HEART RATE: 81 BPM | SYSTOLIC BLOOD PRESSURE: 121 MMHG | DIASTOLIC BLOOD PRESSURE: 83 MMHG | OXYGEN SATURATION: 98 % | RESPIRATION RATE: 16 BRPM

## 2022-09-22 ENCOUNTER — POSTPARTUM VISIT (OUTPATIENT)
Dept: OBGYN CLINIC | Age: 27
End: 2022-09-22

## 2022-09-22 VITALS
DIASTOLIC BLOOD PRESSURE: 75 MMHG | HEART RATE: 84 BPM | BODY MASS INDEX: 35.28 KG/M2 | WEIGHT: 212 LBS | SYSTOLIC BLOOD PRESSURE: 106 MMHG

## 2022-09-22 DIAGNOSIS — F32.1 CURRENT MODERATE EPISODE OF MAJOR DEPRESSIVE DISORDER, UNSPECIFIED WHETHER RECURRENT (HCC): ICD-10-CM

## 2022-09-22 PROCEDURE — 99024 POSTOP FOLLOW-UP VISIT: CPT | Performed by: STUDENT IN AN ORGANIZED HEALTH CARE EDUCATION/TRAINING PROGRAM

## 2022-09-22 RX ORDER — SERTRALINE HYDROCHLORIDE 25 MG/1
25 TABLET, FILM COATED ORAL DAILY
Qty: 7 TABLET | Refills: 0 | Status: SHIPPED | OUTPATIENT
Start: 2022-09-22 | End: 2022-09-29

## 2022-09-22 NOTE — PROGRESS NOTES
Postpartum Visit    Yonatan Dubois is a [address] y.o. female [id number], 1 weeks Post Partum s/p  w/ IUD on 22    The patient was seen. Her pregnancy was complicated by GDMA1, Anxiety and BMI 37. She is  breast feeding and denies signs or symptoms of mastitis. The patient completed the E.P.D.S. Post Partum Depression Evaluation form and scored 15. She does have signs or symptoms of post partum depression. She denies any suicidal thoughts with a plan, intent to harm others, and delusional ideas. She does admit to having good home support. Today her lochia is light she denies any dizziness or shortness of breath. Her bowels are regular and she denies any urinary tract symptomology. She is using abstinence for family planning and for STD prevention. OB History    Para Term  AB Living   4 3 3 0 1 3   SAB IAB Ectopic Molar Multiple Live Births   1 0 0 0 0 3     Patient Active Problem List   Diagnosis    Acute cystitis without hematuria    Dysmenorrhea    Anxiety    Rh negative state in antepartum period    Episode of recurrent major depressive disorder ([facility] [address]    Need for Tdap vaccination    GDMA1    BMI 37    Rhneg/RI/GBSneg     w/ IUD 22 M Apg 8/9 Wt 9#8       Vitals:   Blood pressure 106/75, pulse 84, weight 212 lb (96.2 kg), last menstrual period 2021, not currently breastfeeding. Physical Exam:  General:  no apparent distress, alert and cooperative  Lungs:  No increased work of breathing, good air exchange, clear to auscultation bilaterally, no crackles or wheezing  Heart:  regular rate and rhythm and no murmur    Abdomen: Abdomen soft, non-tender.  BS normal. No masses,  No organomegaly  Fundus: non-tender, normal size, firm, below umbilicus  Extremities:  no calf tenderness, non edematous    Pelvic Exam:   External genitalia: General appearance; normal, Hair distribution; normal, Lesions absent  Urinary system: urethral meatus normal  Vaginal: normal mucosa, scant blood, IUD strings very long. Cut shorter with scissors. Assessment:  Carla Green is a 27 y.o. female [id number], 1 weeks Post Partum s/p  w/ IUD on 22   - Stable, continue routine post partum care    Patient Active Problem List    Diagnosis Date Noted     w/ IUD 22 M Apg 8/9 Wt 9#8 2022     Priority: High    Rhneg/RI/GBSneg 09/15/2022     Priority: Medium    BMI 37 2022     Priority: Medium    GDMA1 2022     Priority: Medium    Episode of recurrent major depressive disorder ([facility] [address] 2022     Priority: Medium     Zoloft started 22       Need for Tdap vaccination 2022     Given       Rh negative state in antepartum period 2022     Rhogam       Dysmenorrhea 2021    Anxiety 2021    Acute cystitis without hematuria      Return in about 2 weeks (around 10/6/2022) for PP Visit. Counseling Completed:  Signs & Symptoms of mastitis and when to notify office discussed.   Secondary smoke risks including increased risks of respiratory problems, Sudden infant death syndrome, and potential malignancies discussed  Abstinence, family planning counseling and STD counseling discussed  Continue with post operative restrictions until 6 weeks post partum  No heavy lifting or [place] until 6 weeks post partum    DO K[facility] Osullivan Ob/[facility]   2022, 1:53 PM

## 2022-10-12 ENCOUNTER — POSTPARTUM VISIT (OUTPATIENT)
Dept: OBGYN CLINIC | Age: 27
End: 2022-10-12

## 2022-10-12 VITALS
WEIGHT: 217.4 LBS | DIASTOLIC BLOOD PRESSURE: 81 MMHG | BODY MASS INDEX: 36.18 KG/M2 | HEART RATE: 86 BPM | SYSTOLIC BLOOD PRESSURE: 125 MMHG

## 2022-10-12 PROCEDURE — 99024 POSTOP FOLLOW-UP VISIT: CPT | Performed by: STUDENT IN AN ORGANIZED HEALTH CARE EDUCATION/TRAINING PROGRAM

## 2022-10-12 NOTE — PROGRESS NOTES
Postpartum Visit    Larisa Tran is a 27 y.o. female [id number], 4 weeks Post Partum s/p  w/ IUD on 22    The patient was seen. Her pregnancy was complicated by GDMA1, anxiety and BMI 37. She is  breast feeding and denies signs or symptoms of mastitis. The patient completed the E.P.D.S. Post Partum Depression Evaluation form and scored 10. She does not have signs or symptoms of post partum depression. She denies any suicidal thoughts with a plan, intent to harm others, and delusional ideas. She does admit to having good home support. Today her lochia is light she denies any dizziness or shortness of breath. Her bowels are regular and she denies any urinary tract symptomology. She is using abstinence for family planning and for STD prevention. OB History    Para Term  AB Living   4 3 3 0 1 3   SAB IAB Ectopic Molar Multiple Live Births   1 0 0 0 0 3     Patient Active Problem List   Diagnosis    Acute cystitis without hematuria    Dysmenorrhea    Anxiety    Rh negative state in antepartum period    Episode of recurrent major depressive disorder ([facility] [address]    Need for Tdap vaccination    GDMA1    BMI 37    Rhneg/RI/GBSneg     w/ IUD 22 M Apg 8/9 Wt 9#8       Vitals:   Blood pressure 125/81, pulse 86, weight 217 lb 6.4 oz (98.6 kg), not currently breastfeeding. Physical Exam:  General:  no apparent distress, alert and cooperative  Lungs:  No increased work of breathing, good air exchange, clear to auscultation bilaterally, no crackles or wheezing  Heart:  regular rate and rhythm and no murmur    Abdomen: Abdomen soft, non-tender.  BS normal. No masses,  No organomegaly  Fundus: non-tender, normal size, firm, below umbilicus  Perineum: not inspected  Extremities:  no calf tenderness, non edematous    Assessment:  Supriya Gamino Félix Schulz is a 27 y.o. female [id number], 4 weeks Post Partum s/p  w/ IUD on 22   - Stable, continue routine post partum care   - Continue Zoloft 75 mg    Patient Active Problem List    Diagnosis Date Noted     w/ IUD 22 M Apg 8/9 Wt 9#8 2022     Priority: High    Rhneg/RI/GBSneg 09/15/2022     Priority: Medium    BMI 37 2022     Priority: Medium    GDMA1 2022     Priority: Medium    Episode of recurrent major depressive disorder ([facility] [address] 2022     Priority: Medium     Zoloft started 22       Need for Tdap vaccination 2022     Given       Rh negative state in antepartum period 2022     Rhogam       Dysmenorrhea 2021    Anxiety 2021    Acute cystitis without hematuria      Return in about 2 weeks (around 10/26/2022) for PP Visit. Counseling Completed:  Signs & Symptoms of mastitis and when to notify office discussed.   Secondary smoke risks including increased risks of respiratory problems, Sudden infant death syndrome, and potential malignancies discussed  Abstinence, family planning counseling and STD counseling discussed  Continue with post operative restrictions until 6 weeks post partum  No heavy lifting or [place] until 6 weeks post partum    DO Kiki Lawler Ob/G[facility]   10/12/2022, 2:23 PM

## 2022-10-27 ENCOUNTER — POSTPARTUM VISIT (OUTPATIENT)
Dept: OBGYN CLINIC | Age: 27
End: 2022-10-27
Payer: COMMERCIAL

## 2022-10-27 VITALS
WEIGHT: 217.4 LBS | BODY MASS INDEX: 36.18 KG/M2 | DIASTOLIC BLOOD PRESSURE: 74 MMHG | HEART RATE: 86 BPM | SYSTOLIC BLOOD PRESSURE: 104 MMHG

## 2022-10-27 DIAGNOSIS — O24.410 DIET CONTROLLED GESTATIONAL DIABETES MELLITUS (GDM) IN THIRD TRIMESTER: ICD-10-CM

## 2022-10-27 NOTE — PROGRESS NOTES
Postpartum Visit    Shan Lundy is a 27 y.o. female [id number], 6 weeks Post Partum s/p  on 22    The patient was seen. Her pregnancy was complicated by BMI 37 and GDMA1. She is not breast feeding and denies signs or symptoms of mastitis. The patient completed the E.P.D.S. Post Partum Depression Evaluation form and scored 10. She does not have signs or symptoms of post partum depression. She denies any suicidal thoughts with a plan, intent to harm others, and delusional ideas. She does admit to having good home support. Today her lochia is light she denies any dizziness or shortness of breath. Her bowels are regular and she denies any urinary tract symptomology. She is using IUD for family planning. OB History    Para Term  AB Living   4 3 3 0 1 3   SAB IAB Ectopic Molar Multiple Live Births   1 0 0 0 0 3     Patient Active Problem List   Diagnosis    Acute cystitis without hematuria    Dysmenorrhea    Anxiety    Rh negative state in antepartum period    Episode of recurrent major depressive disorder ([facility] [address]    Need for Tdap vaccination    GDMA1    BMI 37    Rhneg/RI/GBSneg     w/ IUD 22 M Apg 8/9 Wt 9#8       Vitals:   Blood pressure 104/74, pulse 86, weight 217 lb 6.4 oz (98.6 kg), not currently breastfeeding. Vitals:    10/27/22 1035 10/27/22 1104   BP: (!) 128/91 104/74   Site: Left Upper Arm Left Upper Arm   Position: Sitting Sitting   Cuff Size: Medium Adult Large Adult   Pulse: 92 86   Weight: 217 lb 6.4 oz (98.6 kg)          Physical Exam:  General:  no apparent distress, alert and cooperative  Lungs:  No increased work of breathing, good air exchange, clear to auscultation bilaterally, no crackles or wheezing  Heart:  regular rate and rhythm and no murmur    Abdomen: Abdomen soft, non-tender.  BS normal. No masses,  No organomegaly  Fundus: non-tender, normal size, firm, below umbilicus  Perineum: not inspected  Extremities:  no calf tenderness, non edematous    Assessment:  Shmuel ruff a 27 y.o. female [id number], 6 weeks Post Partum s/p  on 22   - Stable, discontinue routine post partum care   - 2 hr GTT ordered due to [address]    Patient Active Problem List    Diagnosis Date Noted     w/ IUD 22 M Apg 8/9 Wt 9#8 2022     Priority: High    Rhneg/RI/GBSneg 09/15/2022     Priority: Medium    BMI 37 2022     Priority: Medium    GDMA1 2022     Priority: Medium    Episode of recurrent major depressive disorder ([facility] [address] 2022     Priority: Medium     Zoloft started 22       Need for Tdap vaccination 2022     Given       Rh negative state in antepartum period 2022     Rhogam       Dysmenorrhea 2021    Anxiety 2021    Acute cystitis without hematuria      Return in about 6 months (around 2023) for Annual.    Counseling Completed:  Family planning counseling and STD counseling discussed  Discontinue with postpartum restrictions  Can resume heavy lifting and Cristina [address], DO  [facility]   10/27/2022, 11:19 AM

## 2023-03-29 ENCOUNTER — TELEPHONE (OUTPATIENT)
Dept: OBGYN CLINIC | Age: 28
End: 2023-03-29

## 2023-05-12 ENCOUNTER — HOSPITAL ENCOUNTER (EMERGENCY)
Facility: CLINIC | Age: 28
Discharge: HOME | End: 2023-05-12
Attending: EMERGENCY MEDICINE

## 2023-05-12 VITALS
BODY MASS INDEX: 35.82 KG/M2 | OXYGEN SATURATION: 100 % | TEMPERATURE: 98.1 F | HEIGHT: 65 IN | WEIGHT: 215 LBS | RESPIRATION RATE: 17 BRPM | HEART RATE: 89 BPM

## 2023-05-12 DIAGNOSIS — K08.89 PAIN, DENTAL: Primary | ICD-10-CM

## 2023-05-12 PROCEDURE — 99283 EMERGENCY DEPT VISIT LOW MDM: CPT

## 2023-05-12 RX ORDER — PENICILLIN V POTASSIUM 500 MG/1
500 TABLET ORAL 4 TIMES DAILY
Qty: 28 TABLET | Refills: 0 | Status: SHIPPED | OUTPATIENT
Start: 2023-05-12 | End: 2023-05-19

## 2023-05-12 RX ORDER — HYDROCODONE BITARTRATE AND ACETAMINOPHEN 5; 325 MG/1; MG/1
1 TABLET ORAL EVERY 6 HOURS PRN
Qty: 12 TABLET | Refills: 0 | Status: SHIPPED | OUTPATIENT
Start: 2023-05-12 | End: 2023-05-15

## 2023-05-12 ASSESSMENT — PAIN - FUNCTIONAL ASSESSMENT: PAIN_FUNCTIONAL_ASSESSMENT: 0-10

## 2023-05-12 ASSESSMENT — PAIN SCALES - GENERAL: PAINLEVEL_OUTOF10: 10

## 2023-05-12 NOTE — ED PROVIDER NOTES
[address] [facility]      Pt Name: Amy Jackson  MRN: [id number]  [place] 1995  Date of evaluation: 5/12/2023      CHIEF COMPLAINT       Chief Complaint   Patient presents with    Dental Pain     Lower right molar area         HISTORY OF PRESENT ILLNESS      The patient presents with dental pain in the right lower molar. This has been going on for a few days. She does not currently see a dentist.  The patient reports no drainage in her mouth or fever. She denies swelling or difficulty breathing or swallowing. Nothing makes her symptoms better or worse otherwise. REVIEW OF SYSTEMS       All systems reviewed and negative unless noted in HPI. The patient denies fever or constitutional symptoms. Denies vision change. Denies any sore throat or rhinorrhea. Dental pain as noted in HPI. Denies any neck pain or stiffness. Denies chest pain or shortness of breath. No nausea,  vomiting or diarrhea. Denies musculoskeletal injury or pain. Denies any weakness, numbness or focal neurologic deficit. Denies any skin rash or edema. No recent psychiatric issues. No easy bruising or bleeding. Denies any polyuria, polydypsia or history of immunocompromise. PAST MEDICAL HISTORY    has a past medical history of Asthma, Chronic kidney disease, Complication of anesthesia, Diet controlled gestational diabetes mellitus (GDM) in third trimester, Neurologic disorder, Postpartum depression, Stress incontinence, and Trauma. SURGICAL HISTORY      has a past surgical history that includes [place] tooth extraction and Tympanostomy tube placement. CURRENT MEDICATIONS       Previous Medications    BLOOD GLUCOSE MONITOR KIT AND SUPPLIES    Dispense sufficient amount for indicated testing frequency plus additional to accommodate PRN testing needs.  Dispense all needed supplies to include: monitor, strips, lancing device, lancets, control solutions,

## 2023-05-12 NOTE — DISCHARGE INSTRUCTIONS
Norco and penicillin as directed. See the dentist soon as possible. May wish to rinse with salt water. Return for worsening pain, swelling, difficulty breathing or swallowing, or if worse in any way. Please understand that at this time there is no evidence for a more serious underlying process, but that early in the process of an illness or injury, an emergency department workup can be falsely reassuring. You should contact your family doctor within the next 48 hours for a follow up appointment    Mikhail Merritt!!!    From [address] and [facility]    On behalf of the Emergency Department staff at [address], I would like to thank you for giving us the opportunity to address your health care needs and concerns. We hope that during your visit, our service was delivered in a professional and caring manner. Please keep [address] in mind as we walk with you down the path to your own personal wellness. Please expect an automated text message or email from us so we can ask a few questions about your health and progress. Based on your answers, a clinician may call you back to offer help and instructions. Please understand that early in the process of an illness or injury, an emergency department workup can be falsely reassuring. If you notice any worsening, changing or persistent symptoms please call your family doctor or return to the ER immediately. Tell us how we did during your visit at [email or web address]   and let us know about your experience

## 2023-10-05 NOTE — FLOWSHEET NOTE
Pt low bp and symptomatic after fluid bolus and zofran given. Will give ephedrine per anesthesia order and continue to monitor pressures. ---